# Patient Record
Sex: MALE | Race: WHITE | NOT HISPANIC OR LATINO | Employment: OTHER | URBAN - METROPOLITAN AREA
[De-identification: names, ages, dates, MRNs, and addresses within clinical notes are randomized per-mention and may not be internally consistent; named-entity substitution may affect disease eponyms.]

---

## 2023-10-06 PROBLEM — N52.9 MALE ERECTILE DISORDER: Status: ACTIVE | Noted: 2023-10-06

## 2023-10-06 PROBLEM — R82.90 ABNORMAL URINALYSIS: Status: ACTIVE | Noted: 2023-10-06

## 2023-10-06 PROBLEM — T50.Z95A IMMUNIZATION REACTION: Status: ACTIVE | Noted: 2023-10-06

## 2023-10-06 PROBLEM — E55.9 VITAMIN D DEFICIENCY: Status: ACTIVE | Noted: 2023-10-06

## 2023-10-06 PROBLEM — I10 BENIGN ESSENTIAL HYPERTENSION: Status: ACTIVE | Noted: 2023-10-06

## 2023-10-06 PROBLEM — T50.905A DRUG REACTION: Status: ACTIVE | Noted: 2023-10-06

## 2023-10-06 PROBLEM — K21.9 GERD (GASTROESOPHAGEAL REFLUX DISEASE): Status: ACTIVE | Noted: 2023-10-06

## 2023-10-06 RX ORDER — OMEPRAZOLE 40 MG/1
1 CAPSULE, DELAYED RELEASE ORAL 2 TIMES DAILY
COMMUNITY
Start: 2019-04-30 | End: 2023-12-06

## 2023-10-06 RX ORDER — TIRZEPATIDE 2.5 MG/.5ML
INJECTION, SOLUTION SUBCUTANEOUS
COMMUNITY
Start: 2023-10-02 | End: 2024-01-29 | Stop reason: WASHOUT

## 2023-10-06 RX ORDER — CITALOPRAM 20 MG/1
1 TABLET, FILM COATED ORAL DAILY
COMMUNITY
Start: 2021-09-14 | End: 2023-10-18

## 2023-10-06 RX ORDER — BROMPHENIRAMINE MALEATE, PSEUDOEPHEDRINE HYDROCHLORIDE, AND DEXTROMETHORPHAN HYDROBROMIDE 2; 30; 10 MG/5ML; MG/5ML; MG/5ML
2.5 SYRUP ORAL EVERY 6 HOURS PRN
COMMUNITY
Start: 2022-12-22

## 2023-10-06 RX ORDER — CHLORTHALIDONE 25 MG/1
0.5 TABLET ORAL DAILY
COMMUNITY
Start: 2021-12-21 | End: 2023-12-04 | Stop reason: SDUPTHER

## 2023-10-06 RX ORDER — LISINOPRIL 10 MG/1
1 TABLET ORAL DAILY
COMMUNITY
Start: 2014-06-23 | End: 2024-01-29 | Stop reason: WASHOUT

## 2023-10-06 RX ORDER — MONTELUKAST SODIUM 10 MG/1
TABLET ORAL
COMMUNITY
Start: 2023-08-17

## 2023-10-06 RX ORDER — AZELASTINE HCL 205.5 UG/1
2 SPRAY NASAL 2 TIMES DAILY
COMMUNITY
Start: 2022-11-18 | End: 2024-02-26 | Stop reason: ALTCHOICE

## 2023-10-06 RX ORDER — BENZONATATE 100 MG/1
100 CAPSULE ORAL 3 TIMES DAILY PRN
COMMUNITY
Start: 2022-12-30

## 2023-10-11 ENCOUNTER — OFFICE VISIT (OUTPATIENT)
Dept: DERMATOLOGY | Facility: CLINIC | Age: 68
End: 2023-10-11
Payer: MEDICARE

## 2023-10-11 DIAGNOSIS — L82.0 INFLAMED SEBORRHEIC KERATOSIS: ICD-10-CM

## 2023-10-11 DIAGNOSIS — D23.9 DERMATOFIBROMA: ICD-10-CM

## 2023-10-11 DIAGNOSIS — D22.9 MULTIPLE BENIGN MELANOCYTIC NEVI: Primary | ICD-10-CM

## 2023-10-11 DIAGNOSIS — L81.4 LENTIGO: ICD-10-CM

## 2023-10-11 DIAGNOSIS — L82.1 SEBORRHEIC KERATOSES: ICD-10-CM

## 2023-10-11 DIAGNOSIS — Z12.83 ENCOUNTER FOR SCREENING FOR MALIGNANT NEOPLASM OF SKIN: ICD-10-CM

## 2023-10-11 DIAGNOSIS — L57.8 SUN-DAMAGED SKIN: ICD-10-CM

## 2023-10-11 DIAGNOSIS — D18.01 CHERRY ANGIOMA: ICD-10-CM

## 2023-10-11 PROCEDURE — 99203 OFFICE O/P NEW LOW 30 MIN: CPT | Performed by: DERMATOLOGY

## 2023-10-11 PROCEDURE — 1159F MED LIST DOCD IN RCRD: CPT | Performed by: DERMATOLOGY

## 2023-10-11 NOTE — PROGRESS NOTES
Subjective     Honorio Kenny is a 68 y.o. male who presents for the following: Skin Check (Spot on lip PCP is concerned about. Pt requesting FBSE. ). On his right upper lip is a pale bump he noted about 6 months ago. No change in size, no pain, itching or bleeding he notes at this spot. He also has a new bump on his left thigh. Denies symptoms are change in growth at this site as well. Lastly he has an extremely itchy bump on his back that he scratches regularly, he notes sometimes it bleeds.    Review of Systems:  No other skin or systemic complaints other than what is documented elsewhere in the note.    The following portions of the chart were reviewed this encounter and updated as appropriate:         Skin Cancer History  No skin cancer on file. Pt denies personal or family hx of skin cancer.      Specialty Problems    None       Objective   Well appearing patient in no apparent distress; mood and affect are within normal limits.    A full examination was performed including scalp, head, eyes, ears, nose, lips, neck, chest, axillae, abdomen, back, buttocks, bilateral upper extremities, bilateral lower extremities, hands, feet, fingers, toes, fingernails, and toenails. All findings within normal limits unless otherwise noted below.    Assessment/Plan   1. Multiple benign melanocytic nevi  - scattered regular brown macules and papules, including patients lesion of concern on the right upper lip    Benign melanocytic nevi  - Discussed benign nature and that no treatment is necessary unless it becomes painful or increases in size. Patient opts for clinical monitoring at this time.    - Sun protective behavior reviewed and encouraged including the use of over-the-counter sunscreen with SPF30+ daily (reapply every 1.5 hours when outdoors), UPF clothing, broad rimmed hats, sunglasses, and avoidance of midday sun. Home skin monitoring encouraged and how to monitor for skin cancer (changing or new moles, new rapidly growing  or non-healing lesions) reviewed. Patient encouraged to call with interval concerns or changes.      2. Encounter for screening for malignant neoplasm of skin    3. Dermatofibroma (2)  Left Lower Leg - Anterior, Right Lower Leg - Anterior  Firm pink-brown papule that dimples with lateral pressure.    4. Lentigo  Scattered tan macules in sun-exposed areas.    5. Seborrheic keratoses  - Scattered waxy tan/grey/brown papules with horn cysts    Seborrheic keratosis (-es)  - Discussed benign nature and that no treatment is necessary unless it becomes painful or increases in size. Patient opts for clinical monitoring at this time.      6. Cherry angioma  - scattered small bright red papules and macules    Cherry angioma(s)  - Discussed benign nature and that no treatment is necessary unless it becomes painful or increases in size. Patient opts for clinical monitoring at this time.      7. Sun-damaged skin  - scattered tan macules, telangiectasias, and general photo-damage    Actinically damaged skin-  - Sun protective behavior reviewed and encouraged including the use of over-the-counter sunscreen with SPF30+ daily (reapply every 1.5 hours when outdoors), UPF clothing, broad rimmed hats, sunglasses, and avoidance of midday sun. Home skin monitoring encouraged and how to monitor for skin cancer (changing or new moles, new rapidly growing or non-healing lesions) reviewed. Patient encouraged to call with interval concerns or changes.      8. Inflamed seborrheic keratosis  Left Upper Back    Patient reports extensive itching and irritation of this lesion on back. Provided reassurance to patient that this lesion is benign and no further treatment is required. However, due to evidence of inflammation on exam today and patient's reports of irritation, offered to treat with cryotherapy today.    Destr of lesion - Left Upper Back    Destruction method: cryotherapy    Informed consent: discussed and consent obtained    Cryotherapy  cycles:  2  Outcome: patient tolerated procedure well with no complications    Post-procedure details: wound care instructions given        Patient to follow up as needed for any skin lesions of concern

## 2023-10-18 DIAGNOSIS — F41.8 DEPRESSION WITH ANXIETY: Primary | ICD-10-CM

## 2023-10-18 RX ORDER — CITALOPRAM 20 MG/1
20 TABLET, FILM COATED ORAL DAILY
Qty: 90 TABLET | Refills: 1 | Status: SHIPPED | OUTPATIENT
Start: 2023-10-18 | End: 2024-02-29

## 2023-12-04 DIAGNOSIS — I10 BENIGN ESSENTIAL HYPERTENSION: Primary | ICD-10-CM

## 2023-12-05 RX ORDER — CHLORTHALIDONE 25 MG/1
12.5 TABLET ORAL DAILY
Qty: 90 TABLET | Refills: 1 | Status: SHIPPED | OUTPATIENT
Start: 2023-12-05 | End: 2024-02-29

## 2023-12-06 DIAGNOSIS — K21.9 GASTROESOPHAGEAL REFLUX DISEASE WITHOUT ESOPHAGITIS: Primary | ICD-10-CM

## 2023-12-06 RX ORDER — OMEPRAZOLE 40 MG/1
40 CAPSULE, DELAYED RELEASE ORAL 2 TIMES DAILY
Qty: 200 CAPSULE | Refills: 1 | Status: SHIPPED | OUTPATIENT
Start: 2023-12-06 | End: 2024-06-04

## 2024-01-08 ENCOUNTER — OFFICE VISIT (OUTPATIENT)
Dept: PRIMARY CARE | Facility: CLINIC | Age: 69
End: 2024-01-08
Payer: MEDICARE

## 2024-01-08 ENCOUNTER — LAB (OUTPATIENT)
Dept: LAB | Facility: LAB | Age: 69
End: 2024-01-08
Payer: COMMERCIAL

## 2024-01-08 VITALS
SYSTOLIC BLOOD PRESSURE: 130 MMHG | OXYGEN SATURATION: 96 % | WEIGHT: 280.65 LBS | BODY MASS INDEX: 39.29 KG/M2 | DIASTOLIC BLOOD PRESSURE: 78 MMHG | RESPIRATION RATE: 16 BRPM | HEIGHT: 71 IN | HEART RATE: 70 BPM

## 2024-01-08 DIAGNOSIS — E66.9 OBESITY (BMI 30-39.9): ICD-10-CM

## 2024-01-08 DIAGNOSIS — R73.03 PREDIABETES: ICD-10-CM

## 2024-01-08 DIAGNOSIS — I10 BENIGN ESSENTIAL HYPERTENSION: ICD-10-CM

## 2024-01-08 DIAGNOSIS — I10 BENIGN ESSENTIAL HYPERTENSION: Primary | ICD-10-CM

## 2024-01-08 LAB
ALBUMIN SERPL BCP-MCNC: 4.1 G/DL (ref 3.4–5)
ALP SERPL-CCNC: 87 U/L (ref 33–136)
ALT SERPL W P-5'-P-CCNC: 53 U/L (ref 10–52)
ANION GAP SERPL CALC-SCNC: 13 MMOL/L (ref 10–20)
AST SERPL W P-5'-P-CCNC: 42 U/L (ref 9–39)
BILIRUB SERPL-MCNC: 0.8 MG/DL (ref 0–1.2)
BUN SERPL-MCNC: 22 MG/DL (ref 6–23)
CALCIUM SERPL-MCNC: 9.8 MG/DL (ref 8.6–10.6)
CHLORIDE SERPL-SCNC: 98 MMOL/L (ref 98–107)
CHOLEST SERPL-MCNC: 211 MG/DL (ref 0–199)
CHOLESTEROL/HDL RATIO: 3.6
CO2 SERPL-SCNC: 32 MMOL/L (ref 21–32)
CREAT SERPL-MCNC: 1.07 MG/DL (ref 0.5–1.3)
EGFRCR SERPLBLD CKD-EPI 2021: 75 ML/MIN/1.73M*2
ERYTHROCYTE [DISTWIDTH] IN BLOOD BY AUTOMATED COUNT: 13.1 % (ref 11.5–14.5)
EST. AVERAGE GLUCOSE BLD GHB EST-MCNC: 114 MG/DL
GLUCOSE SERPL-MCNC: 72 MG/DL (ref 74–99)
HBA1C MFR BLD: 5.6 %
HCT VFR BLD AUTO: 43.5 % (ref 41–52)
HDLC SERPL-MCNC: 58.8 MG/DL
HGB BLD-MCNC: 15.1 G/DL (ref 13.5–17.5)
LDLC SERPL CALC-MCNC: 133 MG/DL
MCH RBC QN AUTO: 32.2 PG (ref 26–34)
MCHC RBC AUTO-ENTMCNC: 34.7 G/DL (ref 32–36)
MCV RBC AUTO: 93 FL (ref 80–100)
NON HDL CHOLESTEROL: 152 MG/DL (ref 0–149)
NRBC BLD-RTO: 0 /100 WBCS (ref 0–0)
PLATELET # BLD AUTO: 125 X10*3/UL (ref 150–450)
POTASSIUM SERPL-SCNC: 4.3 MMOL/L (ref 3.5–5.3)
PROT SERPL-MCNC: 7.6 G/DL (ref 6.4–8.2)
RBC # BLD AUTO: 4.69 X10*6/UL (ref 4.5–5.9)
SODIUM SERPL-SCNC: 139 MMOL/L (ref 136–145)
TRIGL SERPL-MCNC: 97 MG/DL (ref 0–149)
VLDL: 19 MG/DL (ref 0–40)
WBC # BLD AUTO: 5.8 X10*3/UL (ref 4.4–11.3)

## 2024-01-08 PROCEDURE — 80061 LIPID PANEL: CPT

## 2024-01-08 PROCEDURE — 3075F SYST BP GE 130 - 139MM HG: CPT | Performed by: INTERNAL MEDICINE

## 2024-01-08 PROCEDURE — 36415 COLL VENOUS BLD VENIPUNCTURE: CPT

## 2024-01-08 PROCEDURE — 3078F DIAST BP <80 MM HG: CPT | Performed by: INTERNAL MEDICINE

## 2024-01-08 PROCEDURE — 80053 COMPREHEN METABOLIC PANEL: CPT

## 2024-01-08 PROCEDURE — 83036 HEMOGLOBIN GLYCOSYLATED A1C: CPT

## 2024-01-08 PROCEDURE — 85027 COMPLETE CBC AUTOMATED: CPT

## 2024-01-08 PROCEDURE — 99214 OFFICE O/P EST MOD 30 MIN: CPT | Performed by: INTERNAL MEDICINE

## 2024-01-08 PROCEDURE — 1159F MED LIST DOCD IN RCRD: CPT | Performed by: INTERNAL MEDICINE

## 2024-01-08 PROCEDURE — 1036F TOBACCO NON-USER: CPT | Performed by: INTERNAL MEDICINE

## 2024-01-08 ASSESSMENT — ENCOUNTER SYMPTOMS
OCCASIONAL FEELINGS OF UNSTEADINESS: 0
LOSS OF SENSATION IN FEET: 0
DEPRESSION: 0

## 2024-01-08 NOTE — PROGRESS NOTES
"Follow UP Visit     Mr. Kenny is a pleasant 68-year-old male with hypertension acid reflux depression anxiety obesity here for follow-up.  Since last visit he stopped taking Mounjaro, weight has gone back up.  Diet is average.  He is trying to be physically active.  He really has no complaints or concerns today.  Taking all his medications.    No fevers no chills, no unintentional weight changes.  No night sweats.    No URI symptoms.    No new or unusual headaches.    No cough no wheezing.    No chest pain or shortness of breath.  No orthopnea no PND.  No palpitations.    Appetite intact, no nausea vomiting diarrhea, no reflux-like symptoms.  No abdominal pain.  No dark stools.    No new joint pain.  No fatigue.  No weakness.    No heat or cold intolerance, constipation diarrhea, unintentional weight changes.    No polyuria polydipsia.      Vitals and exam:Blood pressure 130/78, pulse 70, resp. rate 16, height 1.803 m (5' 11\"), weight 127 kg (280 lb 10.3 oz), SpO2 96 %.  Calm coherent and appropriate    Neck is supple and none tender    Breathing comfortably, clear to auscultation bilaterally    Regular rate rhythm, no murmur gallops or rubs.  Good distal pulses.  No edema.  No JVD.    Abdomen soft and nontender, normal bowel sounds.    Extremities warm well perfused with no clubbing or cyanosis    Cognition intact.    Assessment plan:  Mr. Kenny is a pleasant 68-year-old male here for follow-up doing well.    Hypertension: At goal, continue chlorthalidone.    Acid reflux: Continue omeprazole 20.  We can discuss weaning off at next visit.    Depression anxiety: Well-controlled, no daily symptoms, on citalopram.    Obesity: Unfortunately gained the weight of Mounjaro, recommend starting Mediterranean diet, reduce snacking.  Will try Zepbound and see if insurance covers.    Prediabetes: Check A1c.  Health maintenance: Just had colonoscopy in October with adenomatous polyps, due back in 2030.  Immunization up-to-date " including RSV.  Routine labs today.

## 2024-01-29 ENCOUNTER — TELEPHONE (OUTPATIENT)
Dept: PHARMACY | Facility: CLINIC | Age: 69
End: 2024-01-29

## 2024-01-29 ENCOUNTER — OFFICE VISIT (OUTPATIENT)
Dept: PRIMARY CARE | Facility: CLINIC | Age: 69
End: 2024-01-29
Payer: MEDICARE

## 2024-01-29 VITALS
HEIGHT: 71 IN | WEIGHT: 281 LBS | TEMPERATURE: 96 F | SYSTOLIC BLOOD PRESSURE: 122 MMHG | RESPIRATION RATE: 18 BRPM | DIASTOLIC BLOOD PRESSURE: 78 MMHG | OXYGEN SATURATION: 97 % | BODY MASS INDEX: 39.34 KG/M2 | HEART RATE: 73 BPM

## 2024-01-29 DIAGNOSIS — I10 BENIGN ESSENTIAL HYPERTENSION: ICD-10-CM

## 2024-01-29 DIAGNOSIS — H65.22 LEFT CHRONIC SEROUS OTITIS MEDIA: Primary | ICD-10-CM

## 2024-01-29 DIAGNOSIS — F32.A DEPRESSION, UNSPECIFIED DEPRESSION TYPE: ICD-10-CM

## 2024-01-29 PROCEDURE — 99203 OFFICE O/P NEW LOW 30 MIN: CPT | Performed by: FAMILY MEDICINE

## 2024-01-29 PROCEDURE — 1036F TOBACCO NON-USER: CPT | Performed by: FAMILY MEDICINE

## 2024-01-29 PROCEDURE — 3078F DIAST BP <80 MM HG: CPT | Performed by: FAMILY MEDICINE

## 2024-01-29 PROCEDURE — 1126F AMNT PAIN NOTED NONE PRSNT: CPT | Performed by: FAMILY MEDICINE

## 2024-01-29 PROCEDURE — 99213 OFFICE O/P EST LOW 20 MIN: CPT | Performed by: FAMILY MEDICINE

## 2024-01-29 PROCEDURE — 1159F MED LIST DOCD IN RCRD: CPT | Performed by: FAMILY MEDICINE

## 2024-01-29 PROCEDURE — 3074F SYST BP LT 130 MM HG: CPT | Performed by: FAMILY MEDICINE

## 2024-01-29 RX ORDER — CHLORTHALIDONE 25 MG/1
25 TABLET ORAL DAILY
Qty: 30 TABLET | Refills: 5 | Status: SHIPPED | OUTPATIENT
Start: 2024-01-29 | End: 2024-04-11 | Stop reason: SDUPTHER

## 2024-01-29 RX ORDER — PREDNISONE 20 MG/1
TABLET ORAL
Qty: 20 TABLET | Refills: 0 | Status: SHIPPED | OUTPATIENT
Start: 2024-01-29 | End: 2024-02-10

## 2024-01-29 RX ORDER — CITALOPRAM 40 MG/1
40 TABLET, FILM COATED ORAL DAILY
Qty: 30 TABLET | Refills: 3 | Status: SHIPPED | OUTPATIENT
Start: 2024-01-29 | End: 2024-05-24

## 2024-01-29 ASSESSMENT — PATIENT HEALTH QUESTIONNAIRE - PHQ9
SUM OF ALL RESPONSES TO PHQ9 QUESTIONS 1 AND 2: 0
2. FEELING DOWN, DEPRESSED OR HOPELESS: NOT AT ALL
1. LITTLE INTEREST OR PLEASURE IN DOING THINGS: NOT AT ALL

## 2024-01-29 ASSESSMENT — PAIN SCALES - GENERAL: PAINLEVEL: 0-NO PAIN

## 2024-01-29 NOTE — PROGRESS NOTES
"Subjective   Patient ID: Honorio Kenny is a 68 y.o. male who presents for Ear Fullness.    Ear Fullness          Review of Systems    Objective   /78   Pulse 73   Temp 35.6 °C (96 °F)   Resp 18   Ht 1.803 m (5' 11\")   Wt 127 kg (281 lb)   SpO2 97%   BMI 39.19 kg/m²     Physical Exam  Constitutional:       General: He is not in acute distress.     Appearance: Normal appearance.   Cardiovascular:      Rate and Rhythm: Normal rate and regular rhythm.      Heart sounds: No murmur heard.  Pulmonary:      Breath sounds: Normal breath sounds. No wheezing.   Neurological:      Mental Status: He is alert.         Assessment/Plan   Problem List Items Addressed This Visit             ICD-10-CM    Benign essential hypertension I10    Relevant Medications    chlorthalidone (Hygroton) 25 mg tablet    Left chronic serous otitis media - Primary H65.22    Relevant Medications    predniSONE (Deltasone) 20 mg tablet    Depression F32.A    Relevant Medications    citalopram (CeleXA) 40 mg tablet          "

## 2024-01-29 NOTE — PROGRESS NOTES
"Subjective   Patient ID: Honorio Kenny is a 68 y.o. male who presents for Ear Fullness.    HPI pt is new     Review of Systems    Objective   /78   Pulse 73   Temp 35.6 °C (96 °F)   Resp 18   Ht 1.803 m (5' 11\")   Wt 127 kg (281 lb)   SpO2 97%   BMI 39.19 kg/m²     Physical Exam    Assessment/Plan          "

## 2024-01-31 DIAGNOSIS — E66.9 OBESITY (BMI 30-39.9): Primary | ICD-10-CM

## 2024-01-31 RX ORDER — TIRZEPATIDE 2.5 MG/.5ML
2.5 INJECTION, SOLUTION SUBCUTANEOUS
Qty: 2 ML | Refills: 0 | Status: SHIPPED | OUTPATIENT
Start: 2024-01-31 | End: 2024-02-26

## 2024-02-23 DIAGNOSIS — E66.9 OBESITY (BMI 30-39.9): ICD-10-CM

## 2024-02-26 RX ORDER — TIRZEPATIDE 2.5 MG/.5ML
2.5 INJECTION, SOLUTION SUBCUTANEOUS
Qty: 2 ML | Refills: 1 | Status: SHIPPED | OUTPATIENT
Start: 2024-02-26 | End: 2024-04-26

## 2024-02-29 ENCOUNTER — OFFICE VISIT (OUTPATIENT)
Dept: OTOLARYNGOLOGY | Facility: CLINIC | Age: 69
End: 2024-02-29
Payer: MEDICARE

## 2024-02-29 VITALS — BODY MASS INDEX: 38.92 KG/M2 | HEIGHT: 71 IN | WEIGHT: 278 LBS | TEMPERATURE: 96.9 F

## 2024-02-29 DIAGNOSIS — J31.0 CHRONIC RHINITIS: ICD-10-CM

## 2024-02-29 DIAGNOSIS — H93.293 ABNORMAL AUDITORY PERCEPTION OF BOTH EARS: Primary | ICD-10-CM

## 2024-02-29 PROCEDURE — 99203 OFFICE O/P NEW LOW 30 MIN: CPT | Performed by: OTOLARYNGOLOGY

## 2024-02-29 PROCEDURE — 1126F AMNT PAIN NOTED NONE PRSNT: CPT | Performed by: OTOLARYNGOLOGY

## 2024-02-29 PROCEDURE — 1159F MED LIST DOCD IN RCRD: CPT | Performed by: OTOLARYNGOLOGY

## 2024-02-29 PROCEDURE — 1160F RVW MEDS BY RX/DR IN RCRD: CPT | Performed by: OTOLARYNGOLOGY

## 2024-02-29 PROCEDURE — 1036F TOBACCO NON-USER: CPT | Performed by: OTOLARYNGOLOGY

## 2024-02-29 RX ORDER — AZELASTINE 1 MG/ML
SPRAY, METERED NASAL
Qty: 30 ML | Refills: 11 | Status: SHIPPED | OUTPATIENT
Start: 2024-02-29

## 2024-02-29 NOTE — PROGRESS NOTES
"ABRAHAN Kenny \"Ed\" is a 68 y.o. male few months of perception of muffled hearing while at the same time sensitive to loud noises that are not loud others.  He has been treated with prednisone and Flonase and his nasal symptoms are improved but the ear symptoms are unchanged.  He has not had problems like this before.      Past Medical History:   Diagnosis Date    Allergic     Anxiety     Depression     GERD (gastroesophageal reflux disease)     Hypertension             Medications:     Current Outpatient Medications:     benzonatate (Tessalon) 100 mg capsule, Take 1 capsule (100 mg) by mouth 3 times a day as needed for cough., Disp: , Rfl:     chlorthalidone (Hygroton) 25 mg tablet, Take 1 tablet (25 mg) by mouth once daily., Disp: 30 tablet, Rfl: 5    citalopram (CeleXA) 40 mg tablet, Take 1 tablet (40 mg) by mouth once daily., Disp: 30 tablet, Rfl: 3    montelukast (Singulair) 10 mg tablet, , Disp: , Rfl:     omeprazole (PriLOSEC) 40 mg DR capsule, TAKE 1 CAPSULE BY MOUTH TWICE  DAILY, Disp: 200 capsule, Rfl: 1    tirzepatide (Mounjaro) 2.5 mg/0.5 mL pen injector, Inject 2.5 mg under the skin 1 (one) time per week., Disp: 2 mL, Rfl: 1    brompheniramine-pseudoeph-DM 2-30-10 mg/5 mL syrup, Take 2.5 mL by mouth every 6 hours if needed., Disp: , Rfl:     chlorthalidone (Hygroton) 25 mg tablet, Take 0.5 tablets (12.5 mg) by mouth once daily., Disp: 90 tablet, Rfl: 1    citalopram (CeleXA) 20 mg tablet, TAKE 1 TABLET BY MOUTH DAILY, Disp: 90 tablet, Rfl: 1     Allergies:  No Known Allergies     Physical Exam:  Last Recorded Vitals  Temperature 36.1 °C (96.9 °F), height 1.803 m (5' 11\"), weight 126 kg (278 lb).  General:     General appearance: Well-developed, well-nourished in no acute distress.       Voice:  normal       Head/face: Normal appearance; nontender to palpation     Facial nerve function: Normal and symmetric bilaterally.    Oral/oropharynx:     Oral vestibule: Normal labial and gingival mucosa     " Tongue/floor of mouth: Normal without lesion     Oropharynx: Clear.  No lesions present of the hard/soft palate, posterior pharynx    Neck:     Neck: Normal appearance, trachea midline     Salivary glands: Normal to palpation bilaterally     Lymph nodes: No cervical lymphadenopathy to palpation     Thyroid: No thyromegaly.  No palpable nodules     Range of motion: Normal    Neurological:     Cortical functions: Alert and oriented x3, appropriate affect       Larynx/hypopharynx:     Laryngeal findings: Mirror exam inadequate or limited secondary to enlarged base of tongue and/or excessive gagging    Ear:     Ear canal: Normal bilaterally     Tympanic membrane: Intact and mobile bilaterally     Pinna: Normal bilaterally     Hearing:  Gross hearing assessment normal by voice    Nose:     Visualized using: Anterior rhinoscopy     Nasopharynx: Inadequate mirror exam secondary to gag, anatomy.       Nasal dorsum: Nontraumatic midline appearance     Septum: Midline     Inferior turbinates: Normally sized     Mucosa: Bilateral, pink, normal appearing       Assessment/Plan   Physical exam is unremarkable.  Recommend add azelastine to Flonase and recheck in a month with an audiogram, sooner as needed         Dioni Greene MD

## 2024-03-26 ENCOUNTER — TELEPHONE (OUTPATIENT)
Dept: PRIMARY CARE | Facility: CLINIC | Age: 69
End: 2024-03-26
Payer: MEDICARE

## 2024-03-26 DIAGNOSIS — E66.9 OBESITY (BMI 30-39.9): Primary | ICD-10-CM

## 2024-03-26 RX ORDER — TIRZEPATIDE 5 MG/.5ML
5 INJECTION, SOLUTION SUBCUTANEOUS
Qty: 2 ML | Refills: 3 | Status: SHIPPED | OUTPATIENT
Start: 2024-03-26 | End: 2024-05-24

## 2024-03-29 ENCOUNTER — OFFICE VISIT (OUTPATIENT)
Dept: OTOLARYNGOLOGY | Facility: CLINIC | Age: 69
End: 2024-03-29
Payer: MEDICARE

## 2024-03-29 ENCOUNTER — CLINICAL SUPPORT (OUTPATIENT)
Dept: AUDIOLOGY | Facility: CLINIC | Age: 69
End: 2024-03-29
Payer: MEDICARE

## 2024-03-29 VITALS — BODY MASS INDEX: 38.92 KG/M2 | WEIGHT: 278 LBS | HEIGHT: 71 IN

## 2024-03-29 DIAGNOSIS — H90.3 ASYMMETRICAL SENSORINEURAL HEARING LOSS: ICD-10-CM

## 2024-03-29 DIAGNOSIS — H90.3 ASNHL (ASYMMETRICAL SENSORINEURAL HEARING LOSS): Primary | ICD-10-CM

## 2024-03-29 DIAGNOSIS — H93.13 TINNITUS OF BOTH EARS: ICD-10-CM

## 2024-03-29 DIAGNOSIS — H90.3 SENSORINEURAL HEARING LOSS (SNHL) OF BOTH EARS: Primary | ICD-10-CM

## 2024-03-29 PROCEDURE — 1160F RVW MEDS BY RX/DR IN RCRD: CPT | Performed by: OTOLARYNGOLOGY

## 2024-03-29 PROCEDURE — 99213 OFFICE O/P EST LOW 20 MIN: CPT | Performed by: OTOLARYNGOLOGY

## 2024-03-29 PROCEDURE — 1036F TOBACCO NON-USER: CPT | Performed by: OTOLARYNGOLOGY

## 2024-03-29 PROCEDURE — 92557 COMPREHENSIVE HEARING TEST: CPT | Performed by: AUDIOLOGIST

## 2024-03-29 PROCEDURE — 1159F MED LIST DOCD IN RCRD: CPT | Performed by: OTOLARYNGOLOGY

## 2024-03-29 PROCEDURE — 92550 TYMPANOMETRY & REFLEX THRESH: CPT | Performed by: AUDIOLOGIST

## 2024-03-29 NOTE — PROGRESS NOTES
"ABRAHAN Kenny \"Honorio\" is a 68 y.o. male few months of perception of muffled hearing while at the same time sensitive to loud noises that are not loud others.  He has been treated with prednisone and Flonase and his nasal symptoms are improved but the ear symptoms are unchanged.  He has not had problems like this before.  Audiogram today with left greater than right high-frequency sensorineural hearing loss.      Past Medical History:   Diagnosis Date    Allergic     Anxiety     Depression     GERD (gastroesophageal reflux disease)     Hypertension             Medications:     Current Outpatient Medications:     azelastine (Astelin) 137 mcg (0.1 %) nasal spray, Use 2 sprays in each nostril twice daily, Disp: 30 mL, Rfl: 11    benzonatate (Tessalon) 100 mg capsule, Take 1 capsule (100 mg) by mouth 3 times a day as needed for cough., Disp: , Rfl:     brompheniramine-pseudoeph-DM 2-30-10 mg/5 mL syrup, Take 2.5 mL by mouth every 6 hours if needed., Disp: , Rfl:     chlorthalidone (Hygroton) 25 mg tablet, Take 1 tablet (25 mg) by mouth once daily., Disp: 30 tablet, Rfl: 5    citalopram (CeleXA) 40 mg tablet, Take 1 tablet (40 mg) by mouth once daily., Disp: 30 tablet, Rfl: 3    montelukast (Singulair) 10 mg tablet, , Disp: , Rfl:     omeprazole (PriLOSEC) 40 mg DR capsule, TAKE 1 CAPSULE BY MOUTH TWICE  DAILY, Disp: 200 capsule, Rfl: 1    tirzepatide (Mounjaro) 5 mg/0.5 mL pen injector, Inject 5 mg under the skin 1 (one) time per week., Disp: 2 mL, Rfl: 3    tirzepatide (Mounjaro) 2.5 mg/0.5 mL pen injector, Inject 2.5 mg under the skin 1 (one) time per week. (Patient not taking: Reported on 3/29/2024), Disp: 2 mL, Rfl: 1     Allergies:  No Known Allergies     Physical Exam:  Last Recorded Vitals  Height 1.803 m (5' 11\"), weight 126 kg (278 lb).  General:     General appearance: Well-developed, well-nourished in no acute distress.       Voice:  normal       Head/face: Normal appearance; nontender to palpation     " Facial nerve function: Normal and symmetric bilaterally.    Oral/oropharynx:     Oral vestibule: Normal labial and gingival mucosa     Tongue/floor of mouth: Normal without lesion     Oropharynx: Clear.  No lesions present of the hard/soft palate, posterior pharynx    Neck:     Neck: Normal appearance, trachea midline     Salivary glands: Normal to palpation bilaterally     Lymph nodes: No cervical lymphadenopathy to palpation     Thyroid: No thyromegaly.  No palpable nodules     Range of motion: Normal    Neurological:     Cortical functions: Alert and oriented x3, appropriate affect       Larynx/hypopharynx:     Laryngeal findings: Mirror exam inadequate or limited secondary to enlarged base of tongue and/or excessive gagging    Ear:     Ear canal: Normal bilaterally     Tympanic membrane: Intact and mobile bilaterally     Pinna: Normal bilaterally     Hearing:  Gross hearing assessment normal by voice    Nose:     Visualized using: Anterior rhinoscopy     Nasopharynx: Inadequate mirror exam secondary to gag, anatomy.       Nasal dorsum: Nontraumatic midline appearance     Septum: Midline     Inferior turbinates: Normally sized     Mucosa: Bilateral, pink, normal appearing       Assessment/Plan   Physical exam is unremarkable.  He may continue nasal sprays.  I recommended MRI of the brain and IAC for evaluation of the asymmetry.  Hearing aid evaluation to be considered.  Recheck audiogram 1 year, sooner as needed       Dioni Greene MD

## 2024-03-29 NOTE — PROGRESS NOTES
AUDIOLOGY ADULT AUDIOMETRIC EVALUATION    Name:  Dustin Kenny  :  1955  Age:  68 y.o.  Date of Evaluation:  2024    Reason for visit: Mr. Kenny is seen in the clinic today at the request of otolaryngology for an audiologic evaluation.     HISTORY  Patient complains of HEARING LOSS LEFT , CRACLE SOUND IN BOTH EARS AND DENIES DIZZY AND FULLNESS.    EVALUATION  See scanned audiogram: “Media” > “Audiology Report”.      RESULTS  Otoscopic Evaluation:  Right Ear: clear ear canal  Left Ear: clear ear canal    Immittance Measures:  Tympanometry:  Right Ear: Type A, normal tympanic membrane mobility with normal middle ear pressure   Left Ear: Type A, normal tympanic membrane mobility with normal middle ear pressure     Acoustic Reflexes:  Ipsilateral Right Ear:   Ipsilateral Left Ear:   Contralateral Right Ear: did not evaluate  Contralateral Left Ear: did not evaluate    Distortion Product Otoacoustic Emissions (DPOAEs):  Right Ear: PASS: 1-5 K HZ   REFER:6-8 K HZ.  Left Ear:   PASS:  1-2 K HZ   REFER: 3-8 K HZ.    Audiometry:  Test Technique and Reliability: BEHAVIORAL  Standard audiometry via supra-aural headphones. Reliability is good.    Pure tone air and bone conduction audiometry:  Right Ear: WITHIN NORMAL LIMITS TO 2 K HZ WITH A MILD TO MODERATE SNHL 3-8 K HZ.  Left Ear: MOSTLY WITHIN NORMAL LIMITS TO 2 K HZ WITH AN ASYMMETRIC SNHL DROP AT 3- 8 K HZ.    Speech Audiometry (Word Recognition Scores):   Right Ear:  96% GOOD  Left Ear:    92% GOOD    IMPRESSIONS   ASYMMETRIC LEFT SNHL WITH CRACKLE SOUND AU.    The presence of acoustic reflexes within normal intensity limits is consistent with normal middle ear and brainstem function, and suggests that auditory sensitivity is not significantly impaired. An elevated or absent acoustic reflex threshold is consistent with a middle ear disorder, hearing loss in the stimulated ear, and/or interruption of neural innervation of the stapedius muscle. Present  DPOAEs suggest normal/near normal cochlear outer hair cell function and are consistent with no greater than a mild hearing loss at those frequencies. Absent DPOAEs are consistent with abnormal cochlear outer hair cell function and some degree of hearing loss at those frequencies.    RECOMMENDATIONS  - Follow up with otolaryngology today as scheduled. FOR SPECIAL TESTS AS INDICATED FOR ASYMMETRY AND HEARING LOSS AND TINNITUS.  - Audiologic evaluation as needed.  - Annual audiologic evaluation, sooner if an acute change is noted.  - Audiologic evaluation in conjunction with otologic care, if an acute change is noted, and/or annually.  - Follow-up with audiology annually for routine hearing aid maintenance, sooner if questions/problems arise.  - Follow-up with medical care team as planned.    PATIENT EDUCATION  Discussed results, impressions and recommendations with the patient. Questions were addressed and the patient was encouraged to contact our office should concerns arise.    Time for this encounter: 40 MINUTES    Jamee Landry  Licensed Audiologist

## 2024-04-08 ENCOUNTER — APPOINTMENT (OUTPATIENT)
Dept: PRIMARY CARE | Facility: CLINIC | Age: 69
End: 2024-04-08
Payer: MEDICARE

## 2024-04-11 ENCOUNTER — OFFICE VISIT (OUTPATIENT)
Dept: PRIMARY CARE | Facility: CLINIC | Age: 69
End: 2024-04-11
Payer: MEDICARE

## 2024-04-11 VITALS
BODY MASS INDEX: 36.65 KG/M2 | HEIGHT: 70 IN | RESPIRATION RATE: 18 BRPM | WEIGHT: 256 LBS | DIASTOLIC BLOOD PRESSURE: 78 MMHG | HEART RATE: 76 BPM | TEMPERATURE: 98 F | OXYGEN SATURATION: 98 % | SYSTOLIC BLOOD PRESSURE: 120 MMHG

## 2024-04-11 DIAGNOSIS — Z00.00 ROUTINE MEDICAL EXAM: ICD-10-CM

## 2024-04-11 DIAGNOSIS — I10 BENIGN ESSENTIAL HYPERTENSION: ICD-10-CM

## 2024-04-11 LAB
ALBUMIN SERPL-MCNC: 3.9 G/DL (ref 3.5–5)
ALP BLD-CCNC: 142 U/L (ref 35–125)
ALT SERPL-CCNC: 52 U/L (ref 5–40)
ANION GAP SERPL CALC-SCNC: 15 MMOL/L
AST SERPL-CCNC: 41 U/L (ref 5–40)
BILIRUB SERPL-MCNC: 0.8 MG/DL (ref 0.1–1.2)
BUN SERPL-MCNC: 20 MG/DL (ref 8–25)
CALCIUM SERPL-MCNC: 9.6 MG/DL (ref 8.5–10.4)
CHLORIDE SERPL-SCNC: 99 MMOL/L (ref 97–107)
CO2 SERPL-SCNC: 24 MMOL/L (ref 24–31)
CREAT SERPL-MCNC: 1.1 MG/DL (ref 0.4–1.6)
EGFRCR SERPLBLD CKD-EPI 2021: 72 ML/MIN/1.73M*2
GLUCOSE SERPL-MCNC: 102 MG/DL (ref 65–99)
POTASSIUM SERPL-SCNC: 3.7 MMOL/L (ref 3.4–5.1)
PROT SERPL-MCNC: 7 G/DL (ref 5.9–7.9)
SODIUM SERPL-SCNC: 138 MMOL/L (ref 133–145)

## 2024-04-11 PROCEDURE — 99397 PER PM REEVAL EST PAT 65+ YR: CPT | Performed by: FAMILY MEDICINE

## 2024-04-11 PROCEDURE — 99215 OFFICE O/P EST HI 40 MIN: CPT | Performed by: FAMILY MEDICINE

## 2024-04-11 PROCEDURE — 3078F DIAST BP <80 MM HG: CPT | Performed by: FAMILY MEDICINE

## 2024-04-11 PROCEDURE — 36415 COLL VENOUS BLD VENIPUNCTURE: CPT | Performed by: FAMILY MEDICINE

## 2024-04-11 PROCEDURE — 80053 COMPREHEN METABOLIC PANEL: CPT | Performed by: FAMILY MEDICINE

## 2024-04-11 PROCEDURE — 1160F RVW MEDS BY RX/DR IN RCRD: CPT | Performed by: FAMILY MEDICINE

## 2024-04-11 PROCEDURE — 1036F TOBACCO NON-USER: CPT | Performed by: FAMILY MEDICINE

## 2024-04-11 PROCEDURE — 1126F AMNT PAIN NOTED NONE PRSNT: CPT | Performed by: FAMILY MEDICINE

## 2024-04-11 PROCEDURE — G0439 PPPS, SUBSEQ VISIT: HCPCS | Performed by: FAMILY MEDICINE

## 2024-04-11 PROCEDURE — 1159F MED LIST DOCD IN RCRD: CPT | Performed by: FAMILY MEDICINE

## 2024-04-11 PROCEDURE — 3074F SYST BP LT 130 MM HG: CPT | Performed by: FAMILY MEDICINE

## 2024-04-11 RX ORDER — CHLORTHALIDONE 25 MG/1
25 TABLET ORAL DAILY
Qty: 90 TABLET | Refills: 3 | Status: SHIPPED | OUTPATIENT
Start: 2024-04-11 | End: 2025-04-06

## 2024-04-11 ASSESSMENT — PATIENT HEALTH QUESTIONNAIRE - PHQ9
1. LITTLE INTEREST OR PLEASURE IN DOING THINGS: NOT AT ALL
SUM OF ALL RESPONSES TO PHQ9 QUESTIONS 1 AND 2: 0
2. FEELING DOWN, DEPRESSED OR HOPELESS: NOT AT ALL

## 2024-04-11 ASSESSMENT — PAIN SCALES - GENERAL: PAINLEVEL: 0-NO PAIN

## 2024-04-19 ENCOUNTER — HOSPITAL ENCOUNTER (OUTPATIENT)
Dept: RADIOLOGY | Facility: CLINIC | Age: 69
Discharge: HOME | End: 2024-04-19
Payer: MEDICARE

## 2024-04-19 DIAGNOSIS — H90.3 ASNHL (ASYMMETRICAL SENSORINEURAL HEARING LOSS): ICD-10-CM

## 2024-04-19 PROCEDURE — 70553 MRI BRAIN STEM W/O & W/DYE: CPT

## 2024-04-19 PROCEDURE — A9575 INJ GADOTERATE MEGLUMI 0.1ML: HCPCS | Performed by: OTOLARYNGOLOGY

## 2024-04-19 PROCEDURE — 2550000001 HC RX 255 CONTRASTS: Performed by: OTOLARYNGOLOGY

## 2024-04-19 PROCEDURE — 70553 MRI BRAIN STEM W/O & W/DYE: CPT | Performed by: RADIOLOGY

## 2024-04-19 RX ORDER — GADOTERATE MEGLUMINE 376.9 MG/ML
0.2 INJECTION INTRAVENOUS
Status: COMPLETED | OUTPATIENT
Start: 2024-04-19 | End: 2024-04-19

## 2024-04-19 RX ADMIN — GADOTERATE MEGLUMINE 23 ML: 376.9 INJECTION INTRAVENOUS at 10:58

## 2024-05-14 ENCOUNTER — OFFICE VISIT (OUTPATIENT)
Dept: PRIMARY CARE | Facility: CLINIC | Age: 69
End: 2024-05-14
Payer: MEDICARE

## 2024-05-14 VITALS
TEMPERATURE: 97.8 F | HEIGHT: 70 IN | HEART RATE: 78 BPM | SYSTOLIC BLOOD PRESSURE: 116 MMHG | OXYGEN SATURATION: 98 % | RESPIRATION RATE: 18 BRPM | WEIGHT: 249 LBS | BODY MASS INDEX: 35.65 KG/M2 | DIASTOLIC BLOOD PRESSURE: 76 MMHG

## 2024-05-14 DIAGNOSIS — M54.50 LOW BACK PAIN, UNSPECIFIED BACK PAIN LATERALITY, UNSPECIFIED CHRONICITY, UNSPECIFIED WHETHER SCIATICA PRESENT: ICD-10-CM

## 2024-05-14 LAB
POC APPEARANCE, URINE: CLEAR
POC BILIRUBIN, URINE: NEGATIVE
POC BLOOD, URINE: NEGATIVE
POC COLOR, URINE: YELLOW
POC GLUCOSE, URINE: NEGATIVE MG/DL
POC KETONES, URINE: NEGATIVE MG/DL
POC LEUKOCYTES, URINE: NEGATIVE
POC NITRITE,URINE: NEGATIVE
POC PH, URINE: 6 PH
POC PROTEIN, URINE: NEGATIVE MG/DL
POC SPECIFIC GRAVITY, URINE: 1.02
POC UROBILINOGEN, URINE: 1 EU/DL

## 2024-05-14 PROCEDURE — 1036F TOBACCO NON-USER: CPT | Performed by: FAMILY MEDICINE

## 2024-05-14 PROCEDURE — 1159F MED LIST DOCD IN RCRD: CPT | Performed by: FAMILY MEDICINE

## 2024-05-14 PROCEDURE — 81002 URINALYSIS NONAUTO W/O SCOPE: CPT | Performed by: FAMILY MEDICINE

## 2024-05-14 PROCEDURE — 99213 OFFICE O/P EST LOW 20 MIN: CPT | Performed by: FAMILY MEDICINE

## 2024-05-14 PROCEDURE — 3078F DIAST BP <80 MM HG: CPT | Performed by: FAMILY MEDICINE

## 2024-05-14 PROCEDURE — 3074F SYST BP LT 130 MM HG: CPT | Performed by: FAMILY MEDICINE

## 2024-05-14 PROCEDURE — 1160F RVW MEDS BY RX/DR IN RCRD: CPT | Performed by: FAMILY MEDICINE

## 2024-05-14 PROCEDURE — 1125F AMNT PAIN NOTED PAIN PRSNT: CPT | Performed by: FAMILY MEDICINE

## 2024-05-14 RX ORDER — NAPROXEN 500 MG/1
500 TABLET ORAL 2 TIMES DAILY PRN
Qty: 60 TABLET | Refills: 0 | Status: SHIPPED | OUTPATIENT
Start: 2024-05-14 | End: 2024-06-10

## 2024-05-14 RX ORDER — CYCLOBENZAPRINE HCL 10 MG
10 TABLET ORAL NIGHTLY PRN
Qty: 30 TABLET | Refills: 0 | Status: SHIPPED | OUTPATIENT
Start: 2024-05-14 | End: 2024-06-03

## 2024-05-14 ASSESSMENT — PATIENT HEALTH QUESTIONNAIRE - PHQ9
2. FEELING DOWN, DEPRESSED OR HOPELESS: NOT AT ALL
SUM OF ALL RESPONSES TO PHQ9 QUESTIONS 1 AND 2: 0
1. LITTLE INTEREST OR PLEASURE IN DOING THINGS: NOT AT ALL

## 2024-05-14 ASSESSMENT — PAIN SCALES - GENERAL: PAINLEVEL: 4

## 2024-05-14 ASSESSMENT — ENCOUNTER SYMPTOMS: BACK PAIN: 1

## 2024-05-14 NOTE — PROGRESS NOTES
"Subjective   Patient ID: Honorio Kenny is a 68 y.o. male who presents for Back Pain (PT HERE FOR BACK PAIN.. THINKS DUE TO KIDNEY STONES).    Back Pain     RT FLANK PAIN 2 WKS    Review of Systems   Musculoskeletal:  Positive for back pain.       Objective   /76   Pulse 78   Temp 36.6 °C (97.8 °F) (Temporal)   Resp 18   Ht 1.778 m (5' 10\")   Wt 113 kg (249 lb)   SpO2 98%   BMI 35.73 kg/m²     Physical Exam  Constitutional:       General: He is not in acute distress.     Appearance: Normal appearance.   Cardiovascular:      Rate and Rhythm: Normal rate and regular rhythm.      Heart sounds: No murmur heard.  Pulmonary:      Breath sounds: Normal breath sounds. No wheezing.   Neurological:      Mental Status: He is alert.         Assessment/Plan   Problem List Items Addressed This Visit             ICD-10-CM    Low back pain M54.50    Relevant Orders    POCT UA (nonautomated) manually resulted (Completed)          "

## 2024-05-24 DIAGNOSIS — F32.A DEPRESSION, UNSPECIFIED DEPRESSION TYPE: ICD-10-CM

## 2024-05-24 DIAGNOSIS — E66.9 OBESITY (BMI 30-39.9): ICD-10-CM

## 2024-05-24 RX ORDER — CITALOPRAM 40 MG/1
40 TABLET, FILM COATED ORAL DAILY
Qty: 90 TABLET | Refills: 3 | Status: SHIPPED | OUTPATIENT
Start: 2024-05-24

## 2024-05-24 RX ORDER — TIRZEPATIDE 5 MG/.5ML
INJECTION, SOLUTION SUBCUTANEOUS
Qty: 6 ML | Refills: 0 | Status: SHIPPED | OUTPATIENT
Start: 2024-05-26

## 2024-06-02 DIAGNOSIS — M54.50 LOW BACK PAIN, UNSPECIFIED BACK PAIN LATERALITY, UNSPECIFIED CHRONICITY, UNSPECIFIED WHETHER SCIATICA PRESENT: ICD-10-CM

## 2024-06-03 RX ORDER — CYCLOBENZAPRINE HCL 10 MG
10 TABLET ORAL NIGHTLY PRN
Qty: 30 TABLET | Refills: 0 | Status: SHIPPED | OUTPATIENT
Start: 2024-06-03 | End: 2024-08-02

## 2024-06-04 DIAGNOSIS — K21.9 GASTROESOPHAGEAL REFLUX DISEASE WITHOUT ESOPHAGITIS: ICD-10-CM

## 2024-06-04 RX ORDER — OMEPRAZOLE 40 MG/1
40 CAPSULE, DELAYED RELEASE ORAL 2 TIMES DAILY
Qty: 180 CAPSULE | Refills: 1 | Status: SHIPPED | OUTPATIENT
Start: 2024-06-04

## 2024-06-10 DIAGNOSIS — M54.50 LOW BACK PAIN, UNSPECIFIED BACK PAIN LATERALITY, UNSPECIFIED CHRONICITY, UNSPECIFIED WHETHER SCIATICA PRESENT: Primary | ICD-10-CM

## 2024-06-10 RX ORDER — NAPROXEN 500 MG/1
500 TABLET ORAL 2 TIMES DAILY PRN
Qty: 60 TABLET | Refills: 1 | Status: SHIPPED | OUTPATIENT
Start: 2024-06-10 | End: 2024-09-08

## 2024-07-05 ENCOUNTER — HOSPITAL ENCOUNTER (OUTPATIENT)
Dept: RADIOLOGY | Facility: CLINIC | Age: 69
Discharge: HOME | End: 2024-07-05
Payer: MEDICARE

## 2024-07-05 ENCOUNTER — OFFICE VISIT (OUTPATIENT)
Dept: PRIMARY CARE | Facility: CLINIC | Age: 69
End: 2024-07-05
Payer: MEDICARE

## 2024-07-05 VITALS
TEMPERATURE: 98.1 F | HEART RATE: 78 BPM | HEIGHT: 71 IN | WEIGHT: 251 LBS | DIASTOLIC BLOOD PRESSURE: 80 MMHG | BODY MASS INDEX: 35.14 KG/M2 | SYSTOLIC BLOOD PRESSURE: 124 MMHG | RESPIRATION RATE: 18 BRPM | OXYGEN SATURATION: 96 %

## 2024-07-05 DIAGNOSIS — M25.562 CHRONIC PAIN OF LEFT KNEE: ICD-10-CM

## 2024-07-05 DIAGNOSIS — G89.29 CHRONIC PAIN OF LEFT KNEE: ICD-10-CM

## 2024-07-05 DIAGNOSIS — M25.562 CHRONIC PAIN OF LEFT KNEE: Primary | ICD-10-CM

## 2024-07-05 DIAGNOSIS — G89.29 CHRONIC PAIN OF LEFT KNEE: Primary | ICD-10-CM

## 2024-07-05 PROCEDURE — 20610 DRAIN/INJ JOINT/BURSA W/O US: CPT | Mod: LT | Performed by: FAMILY MEDICINE

## 2024-07-05 PROCEDURE — 99213 OFFICE O/P EST LOW 20 MIN: CPT | Performed by: FAMILY MEDICINE

## 2024-07-05 PROCEDURE — 73562 X-RAY EXAM OF KNEE 3: CPT | Mod: LT

## 2024-07-05 PROCEDURE — 2500000004 HC RX 250 GENERAL PHARMACY W/ HCPCS (ALT 636 FOR OP/ED): Performed by: FAMILY MEDICINE

## 2024-07-05 PROCEDURE — 2500000005 HC RX 250 GENERAL PHARMACY W/O HCPCS: Performed by: FAMILY MEDICINE

## 2024-07-05 PROCEDURE — 73562 X-RAY EXAM OF KNEE 3: CPT | Mod: LEFT SIDE | Performed by: RADIOLOGY

## 2024-07-05 RX ORDER — TRIAMCINOLONE ACETONIDE 40 MG/ML
2.5 INJECTION, SUSPENSION INTRA-ARTICULAR; INTRAMUSCULAR
Status: COMPLETED | OUTPATIENT
Start: 2024-07-05 | End: 2024-07-05

## 2024-07-05 RX ORDER — LIDOCAINE HYDROCHLORIDE 10 MG/ML
0.5 INJECTION INFILTRATION; PERINEURAL
Status: COMPLETED | OUTPATIENT
Start: 2024-07-05 | End: 2024-07-05

## 2024-07-05 ASSESSMENT — PAIN SCALES - GENERAL: PAINLEVEL: 8

## 2024-07-05 NOTE — PROGRESS NOTES
"Patient ID: Dustin Kenny \"Ed\" is a 69 y.o. male.    Joint Injection Large/Arthrocentesis: L knee on 7/5/2024 2:58 PM  Indications: pain  Details: 22 G needle, anteromedial approach  Medications: 2.5 mg triamcinolone acetonide 40 mg/mL; 0.5 mL lidocaine 10 mg/mL (1 %)  Outcome: tolerated well, no immediate complications  Procedure, treatment alternatives, risks and benefits explained, specific risks discussed. Consent was given by the patient. Immediately prior to procedure a time out was called to verify the correct patient, procedure, equipment, support staff and site/side marked as required. Patient was prepped and draped in the usual sterile fashion.       Subjective   Patient ID: Honorio Kenny is a 69 y.o. male who presents for Knee Pain (Pt here for left knee pain).    HPI     Review of Systems    Objective   /80   Pulse 78   Temp 36.7 °C (98.1 °F) (Temporal)   Resp 18   Ht 1.803 m (5' 11\")   Wt 114 kg (251 lb)   SpO2 96%   BMI 35.01 kg/m²     Physical Exam  Constitutional:       General: He is not in acute distress.     Appearance: Normal appearance.   Cardiovascular:      Rate and Rhythm: Normal rate and regular rhythm.      Heart sounds: No murmur heard.  Pulmonary:      Breath sounds: Normal breath sounds. No wheezing.   Neurological:      Mental Status: He is alert.         Assessment/Plan   Problem List Items Addressed This Visit    None  Visit Diagnoses         Codes    Chronic pain of left knee    -  Primary M25.562, G89.29               "

## 2024-07-08 DIAGNOSIS — F32.A DEPRESSION, UNSPECIFIED DEPRESSION TYPE: ICD-10-CM

## 2024-07-08 DIAGNOSIS — M54.50 LOW BACK PAIN, UNSPECIFIED BACK PAIN LATERALITY, UNSPECIFIED CHRONICITY, UNSPECIFIED WHETHER SCIATICA PRESENT: ICD-10-CM

## 2024-07-08 RX ORDER — CYCLOBENZAPRINE HCL 10 MG
10 TABLET ORAL NIGHTLY PRN
Qty: 30 TABLET | Refills: 0 | Status: SHIPPED | OUTPATIENT
Start: 2024-07-08 | End: 2024-09-06

## 2024-07-08 RX ORDER — CITALOPRAM 40 MG/1
40 TABLET, FILM COATED ORAL DAILY
Qty: 90 TABLET | Refills: 3 | Status: SHIPPED | OUTPATIENT
Start: 2024-07-08

## 2024-07-11 ENCOUNTER — APPOINTMENT (OUTPATIENT)
Dept: PRIMARY CARE | Facility: CLINIC | Age: 69
End: 2024-07-11
Payer: MEDICARE

## 2024-07-31 DIAGNOSIS — M54.50 LOW BACK PAIN, UNSPECIFIED BACK PAIN LATERALITY, UNSPECIFIED CHRONICITY, UNSPECIFIED WHETHER SCIATICA PRESENT: ICD-10-CM

## 2024-08-04 DIAGNOSIS — M54.50 LOW BACK PAIN, UNSPECIFIED BACK PAIN LATERALITY, UNSPECIFIED CHRONICITY, UNSPECIFIED WHETHER SCIATICA PRESENT: ICD-10-CM

## 2024-08-05 RX ORDER — CYCLOBENZAPRINE HCL 10 MG
10 TABLET ORAL NIGHTLY PRN
Qty: 30 TABLET | Refills: 2 | Status: SHIPPED | OUTPATIENT
Start: 2024-08-05 | End: 2024-10-04

## 2024-08-05 RX ORDER — NAPROXEN 500 MG/1
500 TABLET ORAL 2 TIMES DAILY PRN
Qty: 60 TABLET | Refills: 1 | Status: SHIPPED | OUTPATIENT
Start: 2024-08-05 | End: 2024-11-03

## 2024-08-14 DIAGNOSIS — E66.9 OBESITY (BMI 30-39.9): ICD-10-CM

## 2024-08-15 RX ORDER — TIRZEPATIDE 5 MG/.5ML
INJECTION, SOLUTION SUBCUTANEOUS
Qty: 6 ML | Refills: 0 | Status: SHIPPED | OUTPATIENT
Start: 2024-08-18

## 2024-08-23 DIAGNOSIS — K21.9 GASTROESOPHAGEAL REFLUX DISEASE WITHOUT ESOPHAGITIS: ICD-10-CM

## 2024-08-23 RX ORDER — OMEPRAZOLE 40 MG/1
40 CAPSULE, DELAYED RELEASE ORAL 2 TIMES DAILY
Qty: 180 CAPSULE | Refills: 1 | Status: SHIPPED | OUTPATIENT
Start: 2024-08-23

## 2024-10-08 DIAGNOSIS — E66.9 OBESITY (BMI 30-39.9): ICD-10-CM

## 2024-10-10 RX ORDER — TIRZEPATIDE 5 MG/.5ML
INJECTION, SOLUTION SUBCUTANEOUS
Qty: 2 ML | Refills: 0 | Status: SHIPPED | OUTPATIENT
Start: 2024-10-13

## 2024-10-22 DIAGNOSIS — E66.9 OBESITY (BMI 30-39.9): ICD-10-CM

## 2024-10-22 RX ORDER — TIRZEPATIDE 5 MG/.5ML
5 INJECTION, SOLUTION SUBCUTANEOUS
Qty: 2 ML | Refills: 0 | Status: SHIPPED | OUTPATIENT
Start: 2024-10-22

## 2024-10-30 DIAGNOSIS — M54.50 LOW BACK PAIN, UNSPECIFIED BACK PAIN LATERALITY, UNSPECIFIED CHRONICITY, UNSPECIFIED WHETHER SCIATICA PRESENT: ICD-10-CM

## 2024-10-31 RX ORDER — CYCLOBENZAPRINE HCL 10 MG
10 TABLET ORAL NIGHTLY PRN
Qty: 60 TABLET | Refills: 0 | Status: SHIPPED | OUTPATIENT
Start: 2024-10-31 | End: 2024-12-30

## 2024-11-01 ENCOUNTER — OFFICE VISIT (OUTPATIENT)
Dept: PRIMARY CARE | Facility: CLINIC | Age: 69
End: 2024-11-01
Payer: MEDICARE

## 2024-11-01 VITALS
HEART RATE: 93 BPM | HEIGHT: 71 IN | TEMPERATURE: 96.4 F | DIASTOLIC BLOOD PRESSURE: 78 MMHG | SYSTOLIC BLOOD PRESSURE: 128 MMHG | OXYGEN SATURATION: 94 % | RESPIRATION RATE: 18 BRPM | BODY MASS INDEX: 37.18 KG/M2 | WEIGHT: 265.6 LBS

## 2024-11-01 DIAGNOSIS — M17.12 OSTEOARTHRITIS OF LEFT KNEE, UNSPECIFIED OSTEOARTHRITIS TYPE: Primary | ICD-10-CM

## 2024-11-01 DIAGNOSIS — Z01.818 PREOP EXAMINATION: ICD-10-CM

## 2024-11-01 PROBLEM — T50.Z95A IMMUNIZATION REACTION: Status: RESOLVED | Noted: 2023-10-06 | Resolved: 2024-11-01

## 2024-11-01 PROBLEM — J43.1 PANLOBULAR EMPHYSEMA (MULTI): Status: ACTIVE | Noted: 2024-11-01

## 2024-11-01 LAB
ALBUMIN SERPL BCP-MCNC: 3.8 G/DL (ref 3.4–5)
ALP SERPL-CCNC: 98 U/L (ref 33–136)
ALT SERPL W P-5'-P-CCNC: 35 U/L (ref 10–52)
ANION GAP SERPL CALCULATED.3IONS-SCNC: 12 MMOL/L (ref 10–20)
AST SERPL W P-5'-P-CCNC: 26 U/L (ref 9–39)
BASOPHILS # BLD AUTO: 0.06 X10*3/UL (ref 0–0.1)
BASOPHILS NFR BLD AUTO: 0.9 %
BILIRUB SERPL-MCNC: 0.8 MG/DL (ref 0–1.2)
BUN SERPL-MCNC: 23 MG/DL (ref 6–23)
CALCIUM SERPL-MCNC: 9.6 MG/DL (ref 8.6–10.3)
CHLORIDE SERPL-SCNC: 101 MMOL/L (ref 98–107)
CO2 SERPL-SCNC: 30 MMOL/L (ref 21–32)
CREAT SERPL-MCNC: 1.09 MG/DL (ref 0.5–1.3)
EGFRCR SERPLBLD CKD-EPI 2021: 73 ML/MIN/1.73M*2
EOSINOPHIL # BLD AUTO: 0.35 X10*3/UL (ref 0–0.4)
EOSINOPHIL NFR BLD AUTO: 5.4 %
ERYTHROCYTE [DISTWIDTH] IN BLOOD BY AUTOMATED COUNT: 13.8 % (ref 11.5–14.5)
GLUCOSE SERPL-MCNC: 103 MG/DL (ref 74–99)
HCT VFR BLD AUTO: 46.7 % (ref 41–52)
HGB BLD-MCNC: 15.8 G/DL (ref 13.5–17.5)
IMM GRANULOCYTES # BLD AUTO: 0.03 X10*3/UL (ref 0–0.5)
IMM GRANULOCYTES NFR BLD AUTO: 0.5 % (ref 0–0.9)
LYMPHOCYTES # BLD AUTO: 1.25 X10*3/UL (ref 0.8–3)
LYMPHOCYTES NFR BLD AUTO: 19.2 %
MCH RBC QN AUTO: 32 PG (ref 26–34)
MCHC RBC AUTO-ENTMCNC: 33.8 G/DL (ref 32–36)
MCV RBC AUTO: 95 FL (ref 80–100)
MONOCYTES # BLD AUTO: 0.62 X10*3/UL (ref 0.05–0.8)
MONOCYTES NFR BLD AUTO: 9.5 %
NEUTROPHILS # BLD AUTO: 4.19 X10*3/UL (ref 1.6–5.5)
NEUTROPHILS NFR BLD AUTO: 64.5 %
NRBC BLD-RTO: 0 /100 WBCS (ref 0–0)
PLATELET # BLD AUTO: 130 X10*3/UL (ref 150–450)
POTASSIUM SERPL-SCNC: 3.4 MMOL/L (ref 3.5–5.3)
PROT SERPL-MCNC: 7.3 G/DL (ref 6.4–8.2)
RBC # BLD AUTO: 4.93 X10*6/UL (ref 4.5–5.9)
RBC MORPH BLD: NORMAL
SODIUM SERPL-SCNC: 140 MMOL/L (ref 136–145)
WBC # BLD AUTO: 6.5 X10*3/UL (ref 4.4–11.3)

## 2024-11-01 PROCEDURE — 85025 COMPLETE CBC W/AUTO DIFF WBC: CPT | Performed by: FAMILY MEDICINE

## 2024-11-01 PROCEDURE — 99213 OFFICE O/P EST LOW 20 MIN: CPT | Performed by: FAMILY MEDICINE

## 2024-11-01 PROCEDURE — 80053 COMPREHEN METABOLIC PANEL: CPT | Performed by: FAMILY MEDICINE

## 2024-11-01 ASSESSMENT — PATIENT HEALTH QUESTIONNAIRE - PHQ9
2. FEELING DOWN, DEPRESSED OR HOPELESS: NOT AT ALL
1. LITTLE INTEREST OR PLEASURE IN DOING THINGS: NOT AT ALL
SUM OF ALL RESPONSES TO PHQ9 QUESTIONS 1 AND 2: 0

## 2024-11-01 ASSESSMENT — PAIN SCALES - GENERAL: PAINLEVEL_OUTOF10: 6

## 2024-11-06 DIAGNOSIS — E87.6 HYPOKALEMIA: ICD-10-CM

## 2024-11-18 DIAGNOSIS — E66.9 OBESITY (BMI 30-39.9): Primary | ICD-10-CM

## 2024-11-18 DIAGNOSIS — E66.9 OBESITY (BMI 30-39.9): ICD-10-CM

## 2024-11-18 RX ORDER — TIRZEPATIDE 5 MG/.5ML
5 INJECTION, SOLUTION SUBCUTANEOUS
Qty: 2 ML | Refills: 3 | Status: SHIPPED | OUTPATIENT
Start: 2024-11-18

## 2024-11-18 RX ORDER — TIRZEPATIDE 7.5 MG/.5ML
7.5 INJECTION, SOLUTION SUBCUTANEOUS
Qty: 2 ML | Refills: 3 | Status: SHIPPED | OUTPATIENT
Start: 2024-11-18

## 2024-11-27 DIAGNOSIS — E66.9 OBESITY (BMI 30-39.9): ICD-10-CM

## 2024-11-27 RX ORDER — TIRZEPATIDE 7.5 MG/.5ML
7.5 INJECTION, SOLUTION SUBCUTANEOUS
Qty: 2 ML | Refills: 3 | OUTPATIENT
Start: 2024-12-01

## 2024-12-28 DIAGNOSIS — M54.50 LOW BACK PAIN, UNSPECIFIED BACK PAIN LATERALITY, UNSPECIFIED CHRONICITY, UNSPECIFIED WHETHER SCIATICA PRESENT: ICD-10-CM

## 2024-12-30 RX ORDER — CYCLOBENZAPRINE HCL 10 MG
10 TABLET ORAL NIGHTLY PRN
Qty: 60 TABLET | Refills: 0 | Status: SHIPPED | OUTPATIENT
Start: 2024-12-30 | End: 2025-02-28

## 2025-01-09 ENCOUNTER — LAB (OUTPATIENT)
Dept: LAB | Facility: LAB | Age: 70
End: 2025-01-09
Payer: MEDICARE

## 2025-01-09 DIAGNOSIS — E87.6 HYPOKALEMIA: ICD-10-CM

## 2025-01-09 LAB
ALBUMIN SERPL BCP-MCNC: 4.2 G/DL (ref 3.4–5)
ALP SERPL-CCNC: 114 U/L (ref 33–136)
ALT SERPL W P-5'-P-CCNC: 57 U/L (ref 10–52)
ANION GAP SERPL CALCULATED.3IONS-SCNC: 14 MMOL/L (ref 10–20)
AST SERPL W P-5'-P-CCNC: 41 U/L (ref 9–39)
BILIRUB SERPL-MCNC: 1 MG/DL (ref 0–1.2)
BUN SERPL-MCNC: 23 MG/DL (ref 6–23)
CALCIUM SERPL-MCNC: 10 MG/DL (ref 8.6–10.3)
CHLORIDE SERPL-SCNC: 95 MMOL/L (ref 98–107)
CO2 SERPL-SCNC: 34 MMOL/L (ref 21–32)
CREAT SERPL-MCNC: 1.19 MG/DL (ref 0.5–1.3)
EGFRCR SERPLBLD CKD-EPI 2021: 65 ML/MIN/1.73M*2
GLUCOSE SERPL-MCNC: 101 MG/DL (ref 74–99)
POTASSIUM SERPL-SCNC: 3.8 MMOL/L (ref 3.5–5.3)
PROT SERPL-MCNC: 7.3 G/DL (ref 6.4–8.2)
SODIUM SERPL-SCNC: 139 MMOL/L (ref 136–145)

## 2025-01-09 PROCEDURE — 80053 COMPREHEN METABOLIC PANEL: CPT

## 2025-02-06 DIAGNOSIS — E66.9 OBESITY (BMI 30-39.9): ICD-10-CM

## 2025-02-07 RX ORDER — TIRZEPATIDE 7.5 MG/.5ML
INJECTION, SOLUTION SUBCUTANEOUS
Qty: 6 ML | Refills: 3 | Status: SHIPPED | OUTPATIENT
Start: 2025-02-07

## 2025-02-17 DIAGNOSIS — K21.9 GASTROESOPHAGEAL REFLUX DISEASE WITHOUT ESOPHAGITIS: ICD-10-CM

## 2025-02-17 RX ORDER — OMEPRAZOLE 40 MG/1
40 CAPSULE, DELAYED RELEASE ORAL 2 TIMES DAILY
Qty: 180 CAPSULE | Refills: 1 | Status: SHIPPED | OUTPATIENT
Start: 2025-02-17

## 2025-02-18 ENCOUNTER — HOSPITAL ENCOUNTER (OUTPATIENT)
Dept: RADIOLOGY | Facility: CLINIC | Age: 70
Discharge: HOME | End: 2025-02-18
Payer: MEDICARE

## 2025-02-18 DIAGNOSIS — H47.10 UNSPECIFIED PAPILLEDEMA: ICD-10-CM

## 2025-02-18 DIAGNOSIS — H53.10 UNSPECIFIED SUBJECTIVE VISUAL DISTURBANCES: ICD-10-CM

## 2025-02-18 PROCEDURE — 70450 CT HEAD/BRAIN W/O DYE: CPT

## 2025-02-27 DIAGNOSIS — M54.50 LOW BACK PAIN, UNSPECIFIED BACK PAIN LATERALITY, UNSPECIFIED CHRONICITY, UNSPECIFIED WHETHER SCIATICA PRESENT: ICD-10-CM

## 2025-02-27 RX ORDER — CYCLOBENZAPRINE HCL 10 MG
10 TABLET ORAL NIGHTLY PRN
Qty: 60 TABLET | Refills: 0 | Status: SHIPPED | OUTPATIENT
Start: 2025-02-27 | End: 2025-04-28

## 2025-03-02 DIAGNOSIS — I10 BENIGN ESSENTIAL HYPERTENSION: ICD-10-CM

## 2025-03-03 RX ORDER — CHLORTHALIDONE 25 MG/1
25 TABLET ORAL DAILY
Qty: 100 TABLET | Refills: 3 | Status: SHIPPED | OUTPATIENT
Start: 2025-03-03

## 2025-03-22 DIAGNOSIS — K21.9 GASTROESOPHAGEAL REFLUX DISEASE WITHOUT ESOPHAGITIS: ICD-10-CM

## 2025-03-24 RX ORDER — OMEPRAZOLE 40 MG/1
40 CAPSULE, DELAYED RELEASE ORAL 2 TIMES DAILY
Qty: 200 CAPSULE | Refills: 1 | Status: SHIPPED | OUTPATIENT
Start: 2025-03-24

## 2025-03-28 ENCOUNTER — HOSPITAL ENCOUNTER (OUTPATIENT)
Dept: RADIOLOGY | Facility: CLINIC | Age: 70
Discharge: HOME | End: 2025-03-28
Payer: MEDICARE

## 2025-03-28 DIAGNOSIS — H54.61 UNQUALIFIED VISUAL LOSS, RIGHT EYE, NORMAL VISION LEFT EYE: ICD-10-CM

## 2025-03-28 DIAGNOSIS — H57.02 ANISOCORIA: ICD-10-CM

## 2025-03-28 PROCEDURE — 70551 MRI BRAIN STEM W/O DYE: CPT

## 2025-03-28 PROCEDURE — 2550000001 HC RX 255 CONTRASTS: Performed by: PSYCHIATRY & NEUROLOGY

## 2025-03-28 PROCEDURE — A9575 INJ GADOTERATE MEGLUMI 0.1ML: HCPCS | Performed by: PSYCHIATRY & NEUROLOGY

## 2025-03-28 PROCEDURE — 70544 MR ANGIOGRAPHY HEAD W/O DYE: CPT

## 2025-03-28 PROCEDURE — 70543 MRI ORBT/FAC/NCK W/O &W/DYE: CPT

## 2025-03-28 RX ORDER — GADOTERATE MEGLUMINE 376.9 MG/ML
20 INJECTION INTRAVENOUS
Status: COMPLETED | OUTPATIENT
Start: 2025-03-28 | End: 2025-03-28

## 2025-03-28 RX ADMIN — GADOTERATE MEGLUMINE 20 ML: 376.9 INJECTION INTRAVENOUS at 14:22

## 2025-04-04 ENCOUNTER — OFFICE VISIT (OUTPATIENT)
Dept: PRIMARY CARE | Facility: CLINIC | Age: 70
End: 2025-04-04
Payer: MEDICARE

## 2025-04-04 VITALS
RESPIRATION RATE: 18 BRPM | BODY MASS INDEX: 36.68 KG/M2 | OXYGEN SATURATION: 96 % | TEMPERATURE: 96.8 F | SYSTOLIC BLOOD PRESSURE: 108 MMHG | HEIGHT: 71 IN | HEART RATE: 94 BPM | WEIGHT: 262 LBS | DIASTOLIC BLOOD PRESSURE: 62 MMHG

## 2025-04-04 DIAGNOSIS — Z01.818 PREOP EXAMINATION: Primary | ICD-10-CM

## 2025-04-04 DIAGNOSIS — M17.12 OSTEOARTHRITIS OF LEFT KNEE, UNSPECIFIED OSTEOARTHRITIS TYPE: ICD-10-CM

## 2025-04-04 DIAGNOSIS — F41.9 ANXIETY: ICD-10-CM

## 2025-04-04 PROCEDURE — 93005 ELECTROCARDIOGRAM TRACING: CPT | Performed by: FAMILY MEDICINE

## 2025-04-04 PROCEDURE — 99213 OFFICE O/P EST LOW 20 MIN: CPT | Performed by: FAMILY MEDICINE

## 2025-04-04 PROCEDURE — 1123F ACP DISCUSS/DSCN MKR DOCD: CPT | Performed by: FAMILY MEDICINE

## 2025-04-04 PROCEDURE — 3074F SYST BP LT 130 MM HG: CPT | Performed by: FAMILY MEDICINE

## 2025-04-04 PROCEDURE — 1126F AMNT PAIN NOTED NONE PRSNT: CPT | Performed by: FAMILY MEDICINE

## 2025-04-04 PROCEDURE — 93010 ELECTROCARDIOGRAM REPORT: CPT | Performed by: FAMILY MEDICINE

## 2025-04-04 PROCEDURE — 3078F DIAST BP <80 MM HG: CPT | Performed by: FAMILY MEDICINE

## 2025-04-04 PROCEDURE — 99213 OFFICE O/P EST LOW 20 MIN: CPT | Mod: 25 | Performed by: FAMILY MEDICINE

## 2025-04-04 PROCEDURE — 3008F BODY MASS INDEX DOCD: CPT | Performed by: FAMILY MEDICINE

## 2025-04-04 PROCEDURE — 1159F MED LIST DOCD IN RCRD: CPT | Performed by: FAMILY MEDICINE

## 2025-04-04 PROCEDURE — 1036F TOBACCO NON-USER: CPT | Performed by: FAMILY MEDICINE

## 2025-04-04 RX ORDER — HYDROXYZINE HYDROCHLORIDE 25 MG/1
25 TABLET, FILM COATED ORAL 2 TIMES DAILY
Qty: 60 TABLET | Refills: 0 | Status: SHIPPED | OUTPATIENT
Start: 2025-04-04 | End: 2025-05-04

## 2025-04-04 ASSESSMENT — PATIENT HEALTH QUESTIONNAIRE - PHQ9
SUM OF ALL RESPONSES TO PHQ9 QUESTIONS 1 AND 2: 0
1. LITTLE INTEREST OR PLEASURE IN DOING THINGS: NOT AT ALL
2. FEELING DOWN, DEPRESSED OR HOPELESS: NOT AT ALL

## 2025-04-04 ASSESSMENT — PAIN SCALES - GENERAL: PAINLEVEL_OUTOF10: 0-NO PAIN

## 2025-04-04 NOTE — PROGRESS NOTES
"Subjective   Patient ID: Honorio Kenny is a 69 y.o. male who presents for Pre-op Exam.    HPI pt here for presurgical clearance he is having LT TKR  4/17/25 by Dr. Vallejo       Review of Systems    Objective   /62   Pulse 94   Temp 36 °C (96.8 °F)   Resp 18   Ht 1.803 m (5' 11\")   Wt 119 kg (262 lb)   SpO2 96%   BMI 36.54 kg/m²     Physical Exam  Constitutional:       General: He is not in acute distress.     Appearance: Normal appearance.   Cardiovascular:      Rate and Rhythm: Normal rate and regular rhythm.      Heart sounds: No murmur heard.  Pulmonary:      Breath sounds: Normal breath sounds. No wheezing.   Neurological:      Mental Status: He is alert.       Assessment/Plan   Problem List Items Addressed This Visit    None  Visit Diagnoses         Codes    Preop examination    -  Primary Z01.818    Relevant Orders    CBC and Auto Differential    Basic Metabolic Panel    Osteoarthritis of left knee, unspecified osteoarthritis type     M17.12    Anxiety     F41.9        Cleared for  surgery from a primary care stand point       "

## 2025-04-07 LAB
GLUCOSE SERPL-MCNC: NORMAL MG/DL
WBC # BLD AUTO: NORMAL THOUSAND/UL

## 2025-04-09 DIAGNOSIS — Z01.818 PREOP EXAMINATION: ICD-10-CM

## 2025-04-11 LAB
ALBUMIN SERPL-MCNC: 4.1 G/DL (ref 3.6–5.1)
ALP SERPL-CCNC: 111 U/L (ref 35–144)
ALT SERPL-CCNC: 33 U/L (ref 9–46)
ANION GAP SERPL CALCULATED.4IONS-SCNC: 10 MMOL/L (CALC) (ref 7–17)
AST SERPL-CCNC: 26 U/L (ref 10–35)
BASOPHILS # BLD AUTO: 68 CELLS/UL (ref 0–200)
BASOPHILS NFR BLD AUTO: 1 %
BILIRUB SERPL-MCNC: 0.9 MG/DL (ref 0.2–1.2)
BUN SERPL-MCNC: 29 MG/DL (ref 7–25)
CALCIUM SERPL-MCNC: 9.8 MG/DL (ref 8.6–10.3)
CHLORIDE SERPL-SCNC: 97 MMOL/L (ref 98–110)
CO2 SERPL-SCNC: 33 MMOL/L (ref 20–32)
CREAT SERPL-MCNC: 1.18 MG/DL (ref 0.7–1.28)
EGFRCR SERPLBLD CKD-EPI 2021: 66 ML/MIN/1.73M2
EOSINOPHIL # BLD AUTO: 354 CELLS/UL (ref 15–500)
EOSINOPHIL NFR BLD AUTO: 5.2 %
ERYTHROCYTE [DISTWIDTH] IN BLOOD BY AUTOMATED COUNT: 13.1 % (ref 11–15)
GLUCOSE SERPL-MCNC: 104 MG/DL (ref 65–99)
HCT VFR BLD AUTO: 45.8 % (ref 38.5–50)
HGB BLD-MCNC: 15.1 G/DL (ref 13.2–17.1)
LYMPHOCYTES # BLD AUTO: 1401 CELLS/UL (ref 850–3900)
LYMPHOCYTES NFR BLD AUTO: 20.6 %
MCH RBC QN AUTO: 31.5 PG (ref 27–33)
MCHC RBC AUTO-ENTMCNC: 33 G/DL (ref 32–36)
MCV RBC AUTO: 95.6 FL (ref 80–100)
MONOCYTES # BLD AUTO: 632 CELLS/UL (ref 200–950)
MONOCYTES NFR BLD AUTO: 9.3 %
NEUTROPHILS # BLD AUTO: 4345 CELLS/UL (ref 1500–7800)
NEUTROPHILS NFR BLD AUTO: 63.9 %
PLATELET # BLD AUTO: 131 THOUSAND/UL (ref 140–400)
PMV BLD REES-ECKER: 12.1 FL (ref 7.5–12.5)
POTASSIUM SERPL-SCNC: 3.9 MMOL/L (ref 3.5–5.3)
PROT SERPL-MCNC: 7.1 G/DL (ref 6.1–8.1)
RBC # BLD AUTO: 4.79 MILLION/UL (ref 4.2–5.8)
SODIUM SERPL-SCNC: 140 MMOL/L (ref 135–146)
WBC # BLD AUTO: 6.8 THOUSAND/UL (ref 3.8–10.8)

## 2025-04-16 ENCOUNTER — DOCUMENTATION (OUTPATIENT)
Dept: HOME HEALTH SERVICES | Facility: HOME HEALTH | Age: 70
End: 2025-04-16
Payer: MEDICARE

## 2025-04-16 ENCOUNTER — HOME HEALTH ADMISSION (OUTPATIENT)
Dept: HOME HEALTH SERVICES | Facility: HOME HEALTH | Age: 70
End: 2025-04-16
Payer: MEDICARE

## 2025-04-16 NOTE — HH CARE COORDINATION
Home Care received a Referral for Physical Therapy. We have processed the referral for a Start of Care on 04/18.     If you have any questions or concerns, please feel free to contact us at [phone]. Follow the prompts, enter your five digit zip code, and you will be directed to your care team on [facility].

## 2025-04-18 ENCOUNTER — HOME CARE VISIT (OUTPATIENT)
Dept: HOME HEALTH SERVICES | Facility: HOME HEALTH | Age: 70
End: 2025-04-18
Payer: MEDICARE

## 2025-04-18 VITALS — DIASTOLIC BLOOD PRESSURE: 82 MMHG | HEART RATE: 76 BPM | SYSTOLIC BLOOD PRESSURE: 122 MMHG | TEMPERATURE: 98 F

## 2025-04-18 PROCEDURE — G0151 HHCP-SERV OF PT,EA 15 MIN: HCPCS

## 2025-04-18 SDOH — HEALTH STABILITY: PHYSICAL HEALTH: EXERCISE COMMENTS: SUPINE AP QS GS HEEL SLIDES SAQ  SITTING LAQ HEEL SLIDES AP X 10 REPS

## 2025-04-18 ASSESSMENT — ACTIVITIES OF DAILY LIVING (ADL)
OASIS_M1830: 05
ENTERING_EXITING_HOME: STAND BY ASSIST

## 2025-04-18 ASSESSMENT — ENCOUNTER SYMPTOMS
PAIN LOCATION - PAIN QUALITY: ACHEY
DEPRESSION: 0
PAIN LOCATION - PAIN SEVERITY: 7/10
LOSS OF SENSATION IN FEET: 0
HIGHEST PAIN SEVERITY IN PAST 24 HOURS: 8/10
OCCASIONAL FEELINGS OF UNSTEADINESS: 1
PAIN: 1
PERSON REPORTING PAIN: PATIENT
LOWEST PAIN SEVERITY IN PAST 24 HOURS: 0/10
PAIN LOCATION: LEFT KNEE

## 2025-04-23 ENCOUNTER — HOME CARE VISIT (OUTPATIENT)
Dept: HOME HEALTH SERVICES | Facility: HOME HEALTH | Age: 70
End: 2025-04-23
Payer: MEDICARE

## 2025-04-23 VITALS
OXYGEN SATURATION: 98 % | DIASTOLIC BLOOD PRESSURE: 62 MMHG | RESPIRATION RATE: 18 BRPM | TEMPERATURE: 98 F | HEART RATE: 78 BPM | SYSTOLIC BLOOD PRESSURE: 115 MMHG

## 2025-04-23 PROCEDURE — G0151 HHCP-SERV OF PT,EA 15 MIN: HCPCS

## 2025-04-23 SDOH — HEALTH STABILITY: PHYSICAL HEALTH: EXERCISE ACTIVITIES SETS: 1

## 2025-04-23 SDOH — HEALTH STABILITY: PHYSICAL HEALTH

## 2025-04-23 SDOH — HEALTH STABILITY: PHYSICAL HEALTH: PHYSICAL EXERCISE: SITTING

## 2025-04-23 SDOH — HEALTH STABILITY: PHYSICAL HEALTH: PHYSICAL EXERCISE: SUPINE

## 2025-04-23 SDOH — HEALTH STABILITY: PHYSICAL HEALTH: PHYSICAL EXERCISE: 10

## 2025-04-23 SDOH — HEALTH STABILITY: PHYSICAL HEALTH: EXERCISE ACTIVITY: APS, GLUTE/QUAD SETS, HEEL SLIDE, SLR, SAQ, HIP ABD

## 2025-04-23 SDOH — HEALTH STABILITY: PHYSICAL HEALTH: EXERCISE ACTIVITY: APS, MARCHING, LAQ, HEEL SLIDE, HIP ABD/ADD

## 2025-04-23 ASSESSMENT — ENCOUNTER SYMPTOMS
PAIN LOCATION - EXACERBATING FACTORS: MOVEMENT
PAIN LOCATION: LEFT KNEE
SUBJECTIVE PAIN PROGRESSION: GRADUALLY IMPROVING
PAIN LOCATION - RELIEVING FACTORS: REST, ICE, MEDS
HIGHEST PAIN SEVERITY IN PAST 24 HOURS: 10/10
LOWEST PAIN SEVERITY IN PAST 24 HOURS: 6/10
PAIN: 1
PAIN LOCATION - PAIN SEVERITY: 6/10
PERSON REPORTING PAIN: PATIENT
PAIN LOCATION - PAIN QUALITY: DULL

## 2025-04-24 ENCOUNTER — TELEPHONE (OUTPATIENT)
Dept: PRIMARY CARE | Facility: CLINIC | Age: 70
End: 2025-04-24
Payer: MEDICARE

## 2025-04-24 DIAGNOSIS — E66.9 OBESITY (BMI 30-39.9): ICD-10-CM

## 2025-04-25 ENCOUNTER — HOME CARE VISIT (OUTPATIENT)
Dept: HOME HEALTH SERVICES | Facility: HOME HEALTH | Age: 70
End: 2025-04-25
Payer: MEDICARE

## 2025-04-25 VITALS
TEMPERATURE: 97.6 F | RESPIRATION RATE: 18 BRPM | OXYGEN SATURATION: 98 % | DIASTOLIC BLOOD PRESSURE: 72 MMHG | SYSTOLIC BLOOD PRESSURE: 124 MMHG | HEART RATE: 81 BPM

## 2025-04-25 PROCEDURE — G0151 HHCP-SERV OF PT,EA 15 MIN: HCPCS

## 2025-04-25 SDOH — HEALTH STABILITY: PHYSICAL HEALTH: EXERCISE ACTIVITIES SETS: 1

## 2025-04-25 SDOH — HEALTH STABILITY: PHYSICAL HEALTH

## 2025-04-25 SDOH — HEALTH STABILITY: PHYSICAL HEALTH: EXERCISE ACTIVITY: CALF RAISES, HIP FLEX/EXT/ABD

## 2025-04-25 SDOH — HEALTH STABILITY: PHYSICAL HEALTH: PHYSICAL EXERCISE: SUPINE

## 2025-04-25 SDOH — HEALTH STABILITY: PHYSICAL HEALTH: PHYSICAL EXERCISE: STANDING

## 2025-04-25 SDOH — HEALTH STABILITY: PHYSICAL HEALTH: PHYSICAL EXERCISE: SITTING

## 2025-04-25 SDOH — HEALTH STABILITY: PHYSICAL HEALTH: PHYSICAL EXERCISE: 10

## 2025-04-25 SDOH — HEALTH STABILITY: PHYSICAL HEALTH: EXERCISE ACTIVITY: APS, GLUTE/QUAD SETS, HEEL SLIDE, SLR, SAQ, HIP ABD

## 2025-04-25 SDOH — HEALTH STABILITY: PHYSICAL HEALTH: EXERCISE ACTIVITY: APS, MARCHING, LAQ, HEEL SLIDE

## 2025-04-25 ASSESSMENT — ENCOUNTER SYMPTOMS
PAIN LOCATION - PAIN QUALITY: ACHING
PAIN LOCATION: LEFT KNEE
LOWEST PAIN SEVERITY IN PAST 24 HOURS: 4/10
PAIN LOCATION - RELIEVING FACTORS: REST, ICE, MEDS
PAIN: 1
PERSON REPORTING PAIN: PATIENT
HIGHEST PAIN SEVERITY IN PAST 24 HOURS: 6/10
PAIN LOCATION - EXACERBATING FACTORS: MOVEMENT
PAIN LOCATION - PAIN SEVERITY: 5/10
SUBJECTIVE PAIN PROGRESSION: GRADUALLY IMPROVING

## 2025-04-27 DIAGNOSIS — M54.50 LOW BACK PAIN, UNSPECIFIED BACK PAIN LATERALITY, UNSPECIFIED CHRONICITY, UNSPECIFIED WHETHER SCIATICA PRESENT: ICD-10-CM

## 2025-04-28 RX ORDER — CYCLOBENZAPRINE HCL 10 MG
10 TABLET ORAL NIGHTLY PRN
Qty: 60 TABLET | Refills: 0 | Status: SHIPPED | OUTPATIENT
Start: 2025-04-28 | End: 2025-06-27

## 2025-04-30 ENCOUNTER — HOME CARE VISIT (OUTPATIENT)
Dept: HOME HEALTH SERVICES | Facility: HOME HEALTH | Age: 70
End: 2025-04-30
Payer: MEDICARE

## 2025-04-30 VITALS
TEMPERATURE: 98.3 F | DIASTOLIC BLOOD PRESSURE: 72 MMHG | RESPIRATION RATE: 18 BRPM | SYSTOLIC BLOOD PRESSURE: 132 MMHG | HEART RATE: 83 BPM | OXYGEN SATURATION: 98 %

## 2025-04-30 PROCEDURE — G0151 HHCP-SERV OF PT,EA 15 MIN: HCPCS

## 2025-04-30 SDOH — HEALTH STABILITY: PHYSICAL HEALTH: EXERCISE ACTIVITY: APS, MARCHING, LAQ, HEEL SLIDE

## 2025-04-30 SDOH — HEALTH STABILITY: PHYSICAL HEALTH

## 2025-04-30 SDOH — HEALTH STABILITY: PHYSICAL HEALTH: PHYSICAL EXERCISE: SUPINE

## 2025-04-30 SDOH — HEALTH STABILITY: PHYSICAL HEALTH: EXERCISE ACTIVITIES SETS: 1

## 2025-04-30 SDOH — HEALTH STABILITY: PHYSICAL HEALTH: PHYSICAL EXERCISE: 10

## 2025-04-30 SDOH — HEALTH STABILITY: PHYSICAL HEALTH: PHYSICAL EXERCISE: STANDING

## 2025-04-30 SDOH — HEALTH STABILITY: PHYSICAL HEALTH: EXERCISE ACTIVITY: CALF RAISES, HIP FLEX/EXT/ABD, HS CURL, MARCHING, MINI SQUATS

## 2025-04-30 SDOH — HEALTH STABILITY: PHYSICAL HEALTH: PHYSICAL EXERCISE: SITTING

## 2025-04-30 SDOH — HEALTH STABILITY: PHYSICAL HEALTH: EXERCISE ACTIVITY: APS, GLUTE/QUAD SETS, HEEL SLIDE, SLR, SAQ, HIP ABD

## 2025-04-30 ASSESSMENT — ENCOUNTER SYMPTOMS
PAIN: 1
LOWEST PAIN SEVERITY IN PAST 24 HOURS: 3/10
PERSON REPORTING PAIN: PATIENT
PAIN LOCATION - PAIN QUALITY: ACHING
PAIN LOCATION - PAIN SEVERITY: 3/10
SUBJECTIVE PAIN PROGRESSION: GRADUALLY IMPROVING
PAIN LOCATION - RELIEVING FACTORS: REST, ICE, MEDS
PAIN LOCATION: LEFT KNEE
HIGHEST PAIN SEVERITY IN PAST 24 HOURS: 6/10
PAIN LOCATION - EXACERBATING FACTORS: MOVEMENT

## 2025-05-02 ENCOUNTER — HOME CARE VISIT (OUTPATIENT)
Dept: HOME HEALTH SERVICES | Facility: HOME HEALTH | Age: 70
End: 2025-05-02
Payer: MEDICARE

## 2025-05-02 VITALS
SYSTOLIC BLOOD PRESSURE: 126 MMHG | TEMPERATURE: 98 F | DIASTOLIC BLOOD PRESSURE: 72 MMHG | OXYGEN SATURATION: 98 % | HEART RATE: 78 BPM | RESPIRATION RATE: 18 BRPM

## 2025-05-02 PROCEDURE — G0151 HHCP-SERV OF PT,EA 15 MIN: HCPCS

## 2025-05-02 ASSESSMENT — ACTIVITIES OF DAILY LIVING (ADL)
AMBULATION ASSISTANCE: INDEPENDENT
OASIS_M1830: 00
CURRENT_FUNCTION: INDEPENDENT
AMBULATION ASSISTANCE ON FLAT SURFACES: 1
AMBULATION_DISTANCE/DURATION_TOLERATED: 150 FT
AMBULATION ASSISTANCE: 1
PHYSICAL TRANSFERS ASSESSED: 1
HOME_HEALTH_OASIS: 00

## 2025-05-02 ASSESSMENT — ENCOUNTER SYMPTOMS
PAIN LOCATION - PAIN SEVERITY: 3/10
PAIN LOCATION - PAIN QUALITY: ACHING
HIGHEST PAIN SEVERITY IN PAST 24 HOURS: 3/10
PAIN: 1
PERSON REPORTING PAIN: PATIENT
LIMITED RANGE OF MOTION: 1
LOWEST PAIN SEVERITY IN PAST 24 HOURS: 2/10
PAIN LOCATION - EXACERBATING FACTORS: POST EXERCISE
SUBJECTIVE PAIN PROGRESSION: GRADUALLY IMPROVING
PAIN LOCATION - RELIEVING FACTORS: REST, ICE, MEDS
PAIN LOCATION: LEFT KNEE

## 2025-05-06 ENCOUNTER — EVALUATION (OUTPATIENT)
Dept: PHYSICAL THERAPY | Facility: CLINIC | Age: 70
End: 2025-05-06
Payer: MEDICARE

## 2025-05-06 DIAGNOSIS — Z96.652 PRESENCE OF LEFT ARTIFICIAL KNEE JOINT: ICD-10-CM

## 2025-05-06 DIAGNOSIS — M17.12 UNILATERAL PRIMARY OSTEOARTHRITIS, LEFT KNEE: ICD-10-CM

## 2025-05-06 DIAGNOSIS — M17.12 LOCALIZED OSTEOARTHRITIS OF LEFT KNEE: Primary | ICD-10-CM

## 2025-05-06 DIAGNOSIS — Z96.652 HISTORY OF TOTAL LEFT KNEE REPLACEMENT: ICD-10-CM

## 2025-05-06 DIAGNOSIS — R26.2 DIFFICULTY WALKING: ICD-10-CM

## 2025-05-06 PROCEDURE — 97161 PT EVAL LOW COMPLEX 20 MIN: CPT | Mod: GP | Performed by: PHYSICAL THERAPIST

## 2025-05-06 PROCEDURE — 97110 THERAPEUTIC EXERCISES: CPT | Mod: GP | Performed by: PHYSICAL THERAPIST

## 2025-05-06 ASSESSMENT — PAIN - FUNCTIONAL ASSESSMENT: PAIN_FUNCTIONAL_ASSESSMENT: 0-10

## 2025-05-06 ASSESSMENT — PAIN SCALES - GENERAL: PAINLEVEL_OUTOF10: 2

## 2025-05-06 NOTE — PROGRESS NOTES
"Physical Therapy  Physical Therapy Orthopedic Evaluation    Patient Name: Dustin Kenny \"Ed\"  MRN: [id number]  Today's Date: 5/6/2025    Insurance:  Payor: [employer] MEDICARE / Plan: [employer] MEDICARE / Product Type: *No Product type* /   Number of Treatments Authorized: [email or web address]  Certification Period Start Date: 05/06/25  Certification Period End Date: 08/04/25    Current Problem  Problem List Items Addressed This Visit           ICD-10-CM    Localized osteoarthritis of left knee - Primary M17.12    Relevant Orders    Follow Up In Physical Therapy    Difficulty walking R26.2    Relevant Orders    Follow Up In Physical Therapy    Presence of left artificial knee joint Z96.652    Relevant Orders    Follow Up In Physical Therapy     Other Visit Diagnoses         Codes      Unilateral primary osteoarthritis, left knee     M17.12    Relevant Orders    Follow Up In Physical Therapy      History of total left knee replacement     Z96.652    Relevant Orders    Follow Up In Physical Therapy            Precautions:   Precautions  STEADI Fall Risk Score (The score of 4 or more indicates an increased risk of falling): No falls or concerns of falls.  Precautions Comment: Standard TKA precautions.    Medical History Form: Reviewed (scanned into chart)    Subjective:   Subjective   General:  General  Reason for Referral: L TKA  Referred By: MD Yusef  Past Medical History Relevant to Rehab: HTN, OA, Hearing aides  General Comment: Patient underwent L TKA on 4/17/2025 without complication.  Has been discharged for [facility] - performing HEP as instructed - 2 x a day.    Pain:  Pain Assessment: 0-10  0-10 (Numeric) Pain Score: 2 (worst: 4/10 w/medication)  Pain Type: Surgical pain  Pain Location: Knee  Pain Orientation: Left  Pain Frequency: Constant/continuous    Relevant Information (PMH & Previous Tests/Imaging): No new X-rays since surgery  Previous Interventions/Treatments: Physical Therapy - HHC    Prior Level " of Function (PLOF)  Prior Function Per Pt/Caregiver Report  Level of [place]: Independent with ADLs and functional transfers, Independent with homemaking with ambulation  Patient previously independent with all ADLs    Patients Living Environment:   Home Living Comment: Lives with spouse in multi-level home -  1 step to enter.    Primary Language: English    Red Flags: Do you have any of the following? No  Fever/chills, unexplained weight changes, dizziness/fainting, unexplained change in bowel or bladder functions, unexplained malaise or muscle weakness, night pain/sweats, numbness or tingling    Objective   Circumferential at L mid patella:  52 cm  Steri Strips in place - incision with good healing.   HIP  Hip AROM  R hip flexion: (125°): 115  L hip flexion: (125°): 115  R hip ER: (45°): 45  L hip ER: (45°): 40  R hip IR: (45°): 25  L hip IR: (45°): 15  Specific Lower Extremity MMT   R Iliopsoas: (5/5): 5/5  L Iliopsoas: (5/5): 4/5  R Gluteals (prone): (5/5): grossly 4/5  L Gluteals (prone): (5/5): grossly 4-/5  R Gluteals (sidelying): (5/5): 4/5  L Gluteals (sidelying): (5/5): 4-/5    KNEE  Knee AROM  R knee flexion: (140°): 130  L knee flexion: (140°): 97  R knee extension: (0°): 0  L knee extension: (0°): -3  Knee MMT  R knee flexion: (5/5): 5/5  L knee flexion: (5/5): 4/5  R knee extension: (5/5): 5/5  L knee extension: (5/5): 4-/5  Outcome Measures:    Other Measures  5x Sit to Stand: 18 sec (requires use of UE - L leg forward)  Lower Extremity Funtional Score (LEFS): 45 (56.2%)    EDUCATION: home exercise program, plan of care, activity modifications, pain management, and injury pathology  Outpatient Education  Individual(s) Educated: Patient  Education Provided: Home Exercise Program  Plan of Care Discussed and Agreed Upon: yes  Patient Response to Education: Patient/Caregiver Verbalized Understanding of Information, Patient/Caregiver Performed Return Demonstration of Exercises/Activities,  Patient/Caregiver Asked Appropriate Questions  Education Comment: Instructed to increase HEP to 2 sets of 10 each.    Assessment:  PT Assessment Results: Decreased strength, Decreased range of motion, Decreased endurance, Impaired balance, Decreased mobility, Orthopedic restrictions, Pain  Rehab Prognosis: Good  Assessment Comment: Patient presents s/p L TKA on 4-17-25.  Patient with ROM and strength deficits.  Arrives using SPC with antalgic pattern. Will benefit from therapy to address deficits for goal of returning to gait without assistive device and attending events for grandchildren and light yardwork.    Clinical Presentation: Stable and/or uncomplicated characteristics  Personal Factors: None    Plan:  Treatment/Interventions: Education/ Instruction, Gait training, Manual therapy, Neuromuscular re-education, Self care/ home management, Taping techniques, Therapeutic activities, Therapeutic exercises  PT Plan: Skilled PT  PT Frequency: 2 times per week  Duration: 8 weeks  Onset Date: 04/17/25  Certification Period Start Date: 05/06/25  Certification Period End Date: 08/04/25  Number of Treatments Authorized: eval/auth  Rehab Potential: Good  Plan of Care Agreement: Patient    Goals: Set and discussed today  Active       PT Problem       PT Goal 1       Start:  05/06/25    Expected End:  08/04/25       STG  1) Patient will improve LEFS score by 9 points in order to perform functional activities at home and in the community in 4 weeks.  2) Patient will be able to complete ADLs with pain in knee less than 2/10 in 4 weeks.  3) Pt will improve knee LEFT ROM to 0-105 degrees to be able to complete ADLs with less difficulty in 4 weeks.   4) Patient will be independent with HEP to allow for continued improvement in daily tasks at home and in the community in 3 visits.   5) Patient will perform 5 x sit to stand without UE support in 12 seconds in 4 weeks to demonstrate LE strength gains and ease of transfers in 4  weeks.   LTG  1) Patient will have >/=5/5 strength in hip musculature to aid in balance with ambulation on varied surfaces in community in 8 weeks  2) Patient will be able to perform stair climbing in reciprocal manner without pain and light rail use for safe community access in 8 weeks.  3) Patient will be able to walk for up to 30 minutes with minimal gait deviation and no pain for community access in 8 weeks.   4) Patient will improve LEFS score >/=65/80 points in order to perform functional activities at home and in the community by discharge.              Plan of care was developed with input and agreement by the patient    Treatments:  Therapeutic Exercise  Therapeutic Exercise Activity 1: SLR w/QS x 10  Therapeutic Exercise Activity 2: Bridges x 10  Therapeutic Exercise Activity 3: mini-squats x 10  Review of current HEP: HR, QS, SLR, Supine hip abd, standing hip flex/abd/ext, seated marches, mini squats x 10 - BID    Ambulatory Screenings Summary       Screening  Frequency  Date Last Completed   Spiritual and Cultural Beliefs   Screening  each visit or episode of care 4/4/2025   Falls Risk Screening  every ambulatory visit    Pain Screening  annually at primary care visit  4/4/2025   Domestic Violence screening  annually at primary care visit 4/4/2025   Elder Abuse Screening  annually at primary care visit    Depression Screening  annually in the primary care setting 4/4/2025   Suicide Risk Screening  annually in the primary care setting    Nutrition and Food Insecurity   Screening  at least annually at primary care visit     Key Learner  annually in the primary care setting 4/4/2025   Drug Screen  4/4/2025  8:10 AM   Alcohol Screen  4/4/2025  8:10 AM   Advance Directive  4/4/2025         Time Calculation  Start Time: 0915  Stop Time: 1000  Time Calculation (min): 45 min  PT Evaluation Time Entry  PT Evaluation (Low) Time Entry: 33, PT Therapeutic Procedures Time Entry  Therapeutic Exercise Time Entry: 10,

## 2025-05-08 ENCOUNTER — TREATMENT (OUTPATIENT)
Dept: PHYSICAL THERAPY | Facility: CLINIC | Age: 70
End: 2025-05-08
Payer: MEDICARE

## 2025-05-08 DIAGNOSIS — R26.2 DIFFICULTY WALKING: ICD-10-CM

## 2025-05-08 DIAGNOSIS — Z96.652 PRESENCE OF LEFT ARTIFICIAL KNEE JOINT: ICD-10-CM

## 2025-05-08 DIAGNOSIS — M17.12 LOCALIZED OSTEOARTHRITIS OF LEFT KNEE: Primary | ICD-10-CM

## 2025-05-08 PROCEDURE — 97110 THERAPEUTIC EXERCISES: CPT | Mod: GP,CQ

## 2025-05-08 PROCEDURE — 97140 MANUAL THERAPY 1/> REGIONS: CPT | Mod: GP,CQ

## 2025-05-08 ASSESSMENT — PAIN - FUNCTIONAL ASSESSMENT: PAIN_FUNCTIONAL_ASSESSMENT: 0-10

## 2025-05-08 ASSESSMENT — PAIN SCALES - GENERAL: PAINLEVEL_OUTOF10: 2

## 2025-05-08 NOTE — PROGRESS NOTES
Physical Therapy Treatment    Patient Name: Dustin Kenny  MRN: [id number]  Today's Date: 5/8/2025    Current Problem  Problem List Items Addressed This Visit           ICD-10-CM    Localized osteoarthritis of left knee - Primary M17.12    Difficulty walking R26.2    Presence of left artificial knee joint Z96.652       Insurance:  Payor: [employer] MEDICARE / Plan: [employer] HE[employer] MEDICARE / Product Type: *No Product type* /   Number of Treatments Authorized: 2/?  auth  Certification Period Start Date: 05/06/25  Certification Period End Date: 08/04/25    Subjective   General  Reason for Referral: L TKA   DOS 4/17/25  Referred By: MD Yusef  Past Medical History Relevant to Rehab: HTN, OA, Hearing aides  General Comment: PT STATES THE ONLY PAIN HE HAS IS ACROSS HIS KNEE CAP.  HEP GOING WELL. PT 3 WEEKS POST OP TODAY    Performing HEP?: Yes    Precautions  Precautions  Precautions Comment: Standard TKA precautions.  Pain  Pain Assessment: 0-10  0-10 (Numeric) Pain Score: 2  Pain Type: Surgical pain  Pain Location: Knee  Pain Orientation: Left    Objective   General Observation  General Observation: L KNEE ROM 1* - 114* (PT AMB WITH CANE)    Treatments:    Therapeutic Exercise  Therapeutic Exercise Activity 1: SCIFIT ROM SEAT 13 X 6 MIN  Therapeutic Exercise Activity 2: GASTROC STRETCH X 1 MIN  Therapeutic Exercise Activity 3: HEEL RAISES X 1 MIN  Therapeutic Exercise Activity 4: MINI SQUATS X 1 MIN  Therapeutic Exercise Activity 5: HEEL SLIDES WITH STRAP WITH QS X 5 MIN  Therapeutic Exercise Activity 6: SLR X 1 MIN  Therapeutic Exercise Activity 7: SAQ X 1 MIN         Manual Therapy  Manual Therapy Activity 1: L PF MOBS  Manual Therapy Activity 2: STM L QUAD, IT BAND, KNEE, GASTROC, VASQUES  Manual Therapy Activity 3: PROM L KNEE FLEX, EXT                        OP EDUCATION:  Outpatient Education  Education Comment: CONTINUE WITH CURRENT HEP    Assessment:  PT Assessment  Assessment Comment: PT KIRAN EX'S WELL.   NEEDED TO WORK ON HIS SQUAT FORM QUITE A BIT.  HAS A TENDENCY TO JUST BEND HIS KNEES AND KEEP MORE WT ON HIS R LE.  INCREASED KNEE ROM SINCE LAST VISIT. PT STILL DISPLAYS MIN SWELLING IN HIS L KNEE.    Plan:  OP PT Plan  Treatment/Interventions: Education/ Instruction, Gait training, Manual therapy, Neuromuscular re-education, Self care/ home management, Taping techniques, Therapeutic activities, Therapeutic exercises  PT Plan: Skilled PT  PT Frequency: 2 times per week  Duration: 8 weeks  Onset Date: 04/17/25  Certification Period Start Date: 05/06/25  Certification Period End Date: 08/04/25  Number of Treatments Authorized: 2/?  auth  Rehab Potential: Good  Plan of Care Agreement: Patient    Goals:  Active       PT Problem       PT Goal 1       Start:  05/06/25    Expected End:  08/04/25       STG  1) Patient will improve LEFS score by 9 points in order to perform functional activities at home and in the community in 4 weeks.  2) Patient will be able to complete ADLs with pain in knee less than 2/10 in 4 weeks.  3) Pt will improve knee LEFT ROM to 0-105 degrees to be able to complete ADLs with less difficulty in 4 weeks.   4) Patient will be independent with HEP to allow for continued improvement in daily tasks at home and in the community in 3 visits.   5) Patient will perform 5 x sit to stand without UE support in 12 seconds in 4 weeks to demonstrate LE strength gains and ease of transfers in 4 weeks.   LTG  1) Patient will have >/=5/5 strength in hip musculature to aid in balance with ambulation on varied surfaces in community in 8 weeks  2) Patient will be able to perform stair climbing in reciprocal manner without pain and light rail use for safe community access in 8 weeks.  3) Patient will be able to walk for up to 30 minutes with minimal gait deviation and no pain for community access in 8 weeks.   4) Patient will improve LEFS score >/=65/80 points in order to perform functional activities at home and in  the community by discharge.               Time Calculation  Start Time: 0950  Stop Time: 1028  Time Calculation (min): 38 min  PT Therapeutic Procedures Time Entry  Manual Therapy Time Entry: 18  Therapeutic Exercise Time Entry: 20,

## 2025-05-10 DIAGNOSIS — M54.50 LOW BACK PAIN, UNSPECIFIED BACK PAIN LATERALITY, UNSPECIFIED CHRONICITY, UNSPECIFIED WHETHER SCIATICA PRESENT: ICD-10-CM

## 2025-05-12 RX ORDER — CYCLOBENZAPRINE HCL 10 MG
10 TABLET ORAL NIGHTLY PRN
Qty: 60 TABLET | Refills: 0 | OUTPATIENT
Start: 2025-05-12 | End: 2025-07-11

## 2025-05-13 ENCOUNTER — TREATMENT (OUTPATIENT)
Dept: PHYSICAL THERAPY | Facility: CLINIC | Age: 70
End: 2025-05-13
Payer: MEDICARE

## 2025-05-13 DIAGNOSIS — M17.12 UNILATERAL PRIMARY OSTEOARTHRITIS, LEFT KNEE: ICD-10-CM

## 2025-05-13 DIAGNOSIS — R26.2 DIFFICULTY WALKING: ICD-10-CM

## 2025-05-13 DIAGNOSIS — Z96.652 PRESENCE OF LEFT ARTIFICIAL KNEE JOINT: ICD-10-CM

## 2025-05-13 DIAGNOSIS — Z96.652 HISTORY OF TOTAL LEFT KNEE REPLACEMENT: ICD-10-CM

## 2025-05-13 DIAGNOSIS — M17.12 LOCALIZED OSTEOARTHRITIS OF LEFT KNEE: Primary | ICD-10-CM

## 2025-05-13 PROCEDURE — 97140 MANUAL THERAPY 1/> REGIONS: CPT | Mod: GP | Performed by: PHYSICAL THERAPIST

## 2025-05-13 PROCEDURE — 97110 THERAPEUTIC EXERCISES: CPT | Mod: GP | Performed by: PHYSICAL THERAPIST

## 2025-05-13 ASSESSMENT — PAIN - FUNCTIONAL ASSESSMENT: PAIN_FUNCTIONAL_ASSESSMENT: 0-10

## 2025-05-13 ASSESSMENT — PAIN SCALES - GENERAL: PAINLEVEL_OUTOF10: 8

## 2025-05-15 ENCOUNTER — TREATMENT (OUTPATIENT)
Dept: PHYSICAL THERAPY | Facility: CLINIC | Age: 70
End: 2025-05-15
Payer: MEDICARE

## 2025-05-15 DIAGNOSIS — M17.12 LOCALIZED OSTEOARTHRITIS OF LEFT KNEE: Primary | ICD-10-CM

## 2025-05-15 DIAGNOSIS — M17.12 UNILATERAL PRIMARY OSTEOARTHRITIS, LEFT KNEE: ICD-10-CM

## 2025-05-15 DIAGNOSIS — Z96.652 PRESENCE OF LEFT ARTIFICIAL KNEE JOINT: ICD-10-CM

## 2025-05-15 DIAGNOSIS — Z96.652 HISTORY OF TOTAL LEFT KNEE REPLACEMENT: ICD-10-CM

## 2025-05-15 DIAGNOSIS — R26.2 DIFFICULTY WALKING: ICD-10-CM

## 2025-05-15 PROCEDURE — 97110 THERAPEUTIC EXERCISES: CPT | Mod: GP | Performed by: PHYSICAL THERAPIST

## 2025-05-15 PROCEDURE — 97140 MANUAL THERAPY 1/> REGIONS: CPT | Mod: GP | Performed by: PHYSICAL THERAPIST

## 2025-05-15 ASSESSMENT — PAIN SCALES - GENERAL: PAINLEVEL_OUTOF10: 4

## 2025-05-15 ASSESSMENT — PAIN - FUNCTIONAL ASSESSMENT: PAIN_FUNCTIONAL_ASSESSMENT: 0-10

## 2025-05-15 NOTE — PROGRESS NOTES
Physical Therapy Treatment    Patient Name: Dustin Kenny  MRN: [id number]  Today's Date: 5/15/2025    Current Problem  Problem List Items Addressed This Visit           ICD-10-CM    Unilateral primary osteoarthritis, left knee - Primary M17.12    Difficulty walking R26.2    History of total left knee replacement Z96.652       Insurance:  Payor: [employer] MEDICARE / Plan: UNITED HE[employer] MEDICARE / Product Type: *No Product type* /   Number of Treatments Authorized: 3/12  authorized  Certification Period Start Date: 05/06/25  Certification Period End Date: 08/04/25    Subjective   General  Reason for Referral: L TKA   DOS 4/17/25  Referred By: MD Yusef  Past Medical History Relevant to Rehab: HTN, OA, Hearing aides  General Comment: Feels like a band is around the left knee. Knee cap continues to be painful with stepping, transfers.    Performing HEP?: Yes    Precautions  Precautions  Precautions Comment: Standard TKA precautions.  Pain  Pain Assessment: 0-10  0-10 (Numeric) Pain Score: 4  Pain Type: Surgical pain  Pain Location: Knee  Pain Orientation: Left    Objective     General Observation  General Observation: L KNEE ROM 1* - 110* (PT AMB WITH CANE)    Treatments:    Therapeutic Exercise  Therapeutic Exercise Activity 1: SCIFIT ROM SEAT 13 to 11 X 6 MIN  Therapeutic Exercise Activity 2: GASTROC STRETCH X 1 MIN  Therapeutic Exercise Activity 3: HEEL RAISES X 1 MIN  Therapeutic Exercise Activity 4: Seated knee flexion w/sliders x 1 min  Therapeutic Exercise Activity 6: Dynamics: marches, [place], butt kicks 20 ft x 2 - ea  Therapeutic Exercise Activity 7: TG level 5: 2 x 1 min  Therapeutic Exercise Activity 8: SLR x 1 min     Manual Therapy  Manual Therapy Activity 1: L PF mobs  Manual Therapy Activity 2: STM  L quad, ITB  Manual Therapy Activity 3: PROM L knee    OP EDUCATION:  Outpatient Education  Education Comment: CONTINUE WITH CURRENT HEP    Assessment:  PT Assessment  Assessment Comment:  L quad tightness and tenderness. Instructed to stop SLR x a few days given quad tightness and patellar pain. Demonstrating good SLR quality. Incision with 2 locations of suture redness - to monitor.    Plan:  OP PT Plan  Treatment/Interventions: Education/ Instruction, Gait training, Manual therapy, Neuromuscular re-education, Self care/ home management, Taping techniques, Therapeutic activities, Therapeutic exercises  PT Plan: Skilled PT  PT Frequency: 2 times per week  Duration: 8 weeks  Onset Date: 04/17/25  Certification Period Start Date: 05/06/25  Certification Period End Date: 08/04/25  Number of Treatments Authorized: 3/12  authorized  Rehab Potential: Good  Plan of Care Agreement: Patient  Monitor response to therapy. Progress to tolerance.   Goals:  Active       PT Problem       PT Goal 1       Start:  05/06/25    Expected End:  08/04/25       STG  1) Patient will improve LEFS score by 9 points in order to perform functional activities at home and in the community in 4 weeks.  2) Patient will be able to complete ADLs with pain in knee less than 2/10 in 4 weeks.  3) Pt will improve knee LEFT ROM to 0-105 degrees to be able to complete ADLs with less difficulty in 4 weeks.   4) Patient will be independent with HEP to allow for continued improvement in daily tasks at home and in the community in 3 visits.   5) Patient will perform 5 x sit to stand without UE support in 12 seconds in 4 weeks to demonstrate LE strength gains and ease of transfers in 4 weeks.   LTG  1) Patient will have >/=5/5 strength in hip musculature to aid in balance with ambulation on varied surfaces in community in 8 weeks  2) Patient will be able to perform stair climbing in reciprocal manner without pain and light rail use for safe community access in 8 weeks.  3) Patient will be able to walk for up to 30 minutes with minimal gait deviation and no pain for community access in 8 weeks.   4) Patient will improve LEFS score >/=65/80 points  in order to perform functional activities at home and in the community by discharge.               Time Calculation  Start Time: 1045  Stop Time: 1130  Time Calculation (min): 45 min  PT Therapeutic Procedures Time Entry  Manual Therapy Time Entry: 12  Therapeutic Exercise Time Entry: 28,

## 2025-05-20 ENCOUNTER — TREATMENT (OUTPATIENT)
Dept: PHYSICAL THERAPY | Facility: CLINIC | Age: 70
End: 2025-05-20
Payer: MEDICARE

## 2025-05-20 DIAGNOSIS — R26.2 DIFFICULTY WALKING: ICD-10-CM

## 2025-05-20 DIAGNOSIS — M17.12 UNILATERAL PRIMARY OSTEOARTHRITIS, LEFT KNEE: ICD-10-CM

## 2025-05-20 DIAGNOSIS — Z96.652 PRESENCE OF LEFT ARTIFICIAL KNEE JOINT: ICD-10-CM

## 2025-05-20 DIAGNOSIS — M17.12 LOCALIZED OSTEOARTHRITIS OF LEFT KNEE: Primary | ICD-10-CM

## 2025-05-20 DIAGNOSIS — Z96.652 HISTORY OF TOTAL LEFT KNEE REPLACEMENT: ICD-10-CM

## 2025-05-20 PROCEDURE — 97110 THERAPEUTIC EXERCISES: CPT | Mod: GP,CQ

## 2025-05-20 PROCEDURE — 97140 MANUAL THERAPY 1/> REGIONS: CPT | Mod: GP,CQ

## 2025-05-20 ASSESSMENT — PAIN SCALES - GENERAL: PAINLEVEL_OUTOF10: 9

## 2025-05-20 ASSESSMENT — PAIN - FUNCTIONAL ASSESSMENT: PAIN_FUNCTIONAL_ASSESSMENT: 0-10

## 2025-05-20 NOTE — PROGRESS NOTES
"Physical Therapy Treatment    Patient Name: Dustin Kenny  MRN: [id number]  Today's Date: 5/20/2025    Current Problem  Problem List Items Addressed This Visit           ICD-10-CM    Unilateral primary osteoarthritis, left knee - Primary M17.12    Difficulty walking R26.2    History of total left knee replacement Z96.652       Insurance:  Payor: [employer] MEDICARE / Plan: [employer] MEDICARE / Product Type: *No Product type* /   Number of Treatments Authorized: 5/12  authorized  Certification Period Start Date: 05/06/25  Certification Period End Date: 07/01/25    Subjective   General  Reason for Referral: L TKA   DOS 4/17/25  Referred By: MD Yusef  Past Medical History Relevant to Rehab: HTN, OA, Hearing aides  General Comment: PT STATES HIS L KNEE IS HURTING THIS MORNING. THE PAIN IS STILL THE KNEE CAP AREA.  HE DID SOME HOUSE CLEANING AND A LITTLE YARD WORK YESTERDAY.  HE HAS BEEN DOING HIS HEP AND ICING. PT IS 4.5 WEEKS POST OP.    Performing HEP?: Yes    Precautions  Precautions  Precautions Comment: Standard TKA precautions.  Pain  Pain Assessment: 0-10  0-10 (Numeric) Pain Score: 9  Pain Location: Knee  Pain Orientation: Left    Objective   General Observation  General Observation: L KNEE ROM 1* - 110* (PT AMB WITH CANE)    Treatments:    Therapeutic Exercise  Therapeutic Exercise Activity 1: SCIFIT ROM SEAT 13 to 11 X 10 MIN  Therapeutic Exercise Activity 2: GASTROC STRETCH X 1 MIN  Therapeutic Exercise Activity 3: 12\" STEP L KNEE SELF FLEX MOBS X 3 MIN         Manual Therapy  Manual Therapy Activity 1: L PF mobs  Manual Therapy Activity 2: STM L quad, ITB, SHIN, HAM , GASTROC, KNEE AVOIDING SCAR                        OP EDUCATION:  Outpatient Education  Education Comment: CONTINUE WITH CURRENT HEP    Assessment:  PT Assessment  Assessment Comment: FOCUSED TODAY'S VISIT ON DECREASING PAIN AND INCREASING FLEXIBILITY.  PT WAS TIGHT AND TENDER IN HIS R QUAD, VASQUES, M/L KNEE.  HE FELT MUCH " BETTER AFTER MANUAL AND HAD 2/10 PAIN WHEN HE LEFT TODAY'S SESSION.    Plan:  OP PT Plan  Treatment/Interventions: Education/ Instruction, Gait training, Manual therapy, Neuromuscular re-education, Self care/ home management, Taping techniques, Therapeutic activities, Therapeutic exercises  PT Plan: Skilled PT  PT Frequency: 2 times per week  Duration: 8 weeks  Onset Date: 04/17/25  Certification Period Start Date: 05/06/25  Certification Period End Date: 07/01/25  Number of Treatments Authorized: 5/12  authorized  Rehab Potential: Good  Plan of Care Agreement: Patient    Goals:  Active       PT Problem       PT Goal 1       Start:  05/06/25    Expected End:  08/04/25       STG  1) Patient will improve LEFS score by 9 points in order to perform functional activities at home and in the community in 4 weeks.  2) Patient will be able to complete ADLs with pain in knee less than 2/10 in 4 weeks.  3) Pt will improve knee LEFT ROM to 0-105 degrees to be able to complete ADLs with less difficulty in 4 weeks.   4) Patient will be independent with HEP to allow for continued improvement in daily tasks at home and in the community in 3 visits.   5) Patient will perform 5 x sit to stand without UE support in 12 seconds in 4 weeks to demonstrate LE strength gains and ease of transfers in 4 weeks.   LTG  1) Patient will have >/=5/5 strength in hip musculature to aid in balance with ambulation on varied surfaces in community in 8 weeks  2) Patient will be able to perform stair climbing in reciprocal manner without pain and light rail use for safe community access in 8 weeks.  3) Patient will be able to walk for up to 30 minutes with minimal gait deviation and no pain for community access in 8 weeks.   4) Patient will improve LEFS score >/=65/80 points in order to perform functional activities at home and in the community by discharge.               Time Calculation  Start Time: 0900  Stop Time: 0940  Time Calculation (min): 40  min  PT Therapeutic Procedures Time Entry  Manual Therapy Time Entry: 25  Therapeutic Exercise Time Entry: 15,

## 2025-05-27 ENCOUNTER — TREATMENT (OUTPATIENT)
Dept: PHYSICAL THERAPY | Facility: CLINIC | Age: 70
End: 2025-05-27
Payer: MEDICARE

## 2025-05-27 DIAGNOSIS — Z96.652 HISTORY OF TOTAL LEFT KNEE REPLACEMENT: ICD-10-CM

## 2025-05-27 DIAGNOSIS — M17.12 LOCALIZED OSTEOARTHRITIS OF LEFT KNEE: Primary | ICD-10-CM

## 2025-05-27 DIAGNOSIS — Z96.652 PRESENCE OF LEFT ARTIFICIAL KNEE JOINT: ICD-10-CM

## 2025-05-27 DIAGNOSIS — M17.12 UNILATERAL PRIMARY OSTEOARTHRITIS, LEFT KNEE: ICD-10-CM

## 2025-05-27 DIAGNOSIS — R26.2 DIFFICULTY WALKING: ICD-10-CM

## 2025-05-27 PROCEDURE — 97112 NEUROMUSCULAR REEDUCATION: CPT | Mod: GP | Performed by: PHYSICAL THERAPIST

## 2025-05-27 PROCEDURE — 97110 THERAPEUTIC EXERCISES: CPT | Mod: GP | Performed by: PHYSICAL THERAPIST

## 2025-05-27 ASSESSMENT — PAIN SCALES - GENERAL: PAINLEVEL_OUTOF10: 2

## 2025-05-27 ASSESSMENT — PAIN - FUNCTIONAL ASSESSMENT: PAIN_FUNCTIONAL_ASSESSMENT: 0-10

## 2025-05-27 NOTE — PROGRESS NOTES
"Physical Therapy Treatment    Patient Name: Dustin Kenny  MRN: [id number]  Today's Date: 5/27/2025    Current Problem  Problem List Items Addressed This Visit           ICD-10-CM    Unilateral primary osteoarthritis, left knee - Primary M17.12    Difficulty walking R26.2    History of total left knee replacement Z96.652       Insurance:  Payor: [employer] / Plan: [employer] / Product Type: *No Product type* /   Number of Treatments Authorized: 6/12  authorized  Certification Period Start Date: 05/06/25  Certification Period End Date: 07/01/25    Subjective   General  Reason for Referral: L TKA   DOS 4/17/25  Referred By: MD Yusef  Past Medical History Relevant to Rehab: HTN, OA, Hearing aides  General Comment: Did ok over the weekend re: pain.  Knee  continues to be the primary pain issue.    Performing HEP?: Yes    Precautions  Precautions  Precautions Comment: Standard TKA precautions.  Pain  Pain Assessment: 0-10  0-10 (Numeric) Pain Score: 2  Pain Type: Surgical pain  Pain Location: Knee  Pain Orientation: Left    Objective     General Observation  General Observation: L KNEE ROM 1* - 110* (PT AMB WITH CANE)    Treatments:    Therapeutic Exercise  Therapeutic Exercise Activity 1: SCIFIT:  level 2.5 - seat 12 x 6 min.  Therapeutic Exercise Activity 2: GASTROC STRETCH X 1 MIN  Therapeutic Exercise Activity 3: HEEL RAISES X 1 MIN  Therapeutic Exercise Activity 4: Leg curl: 40# - 2 x 10  Therapeutic Exercise Activity 5: Leg ext: 40# 2 x 10  Therapeutic Exercise Activity 6: Dynamics: marches, [place], butt kicks 20 ft x 2 - ea  Therapeutic Exercise Activity 7: TG level 5: 2 x 1 min  Therapeutic Exercise Activity 8: TG level 3 - single leg x 10 - L/R  Therapeutic Exercise Activity 9: Forward step ups: 6\" - x 10 - B  Therapeutic Exercise Activity 10: Side steps ups: 6\" x 10 - B    Balance/Neuromuscular Re-Education  Balance/Neuromuscular Re-Education Activity 1: AirEx beam: side " steps x 2 min  Balance/Neuromuscular Re-Education Activity 2: Rocker board: 1 min side/side - 1 min FWD/BWD  Balance/Neuromuscular Re-Education Activity 3: AirEx Pad: Static standing with head turns x 1 min    Manual Therapy  Manual Therapy Activity 1: L PF mobs  Manual Therapy Activity 2: STM of L quad, ITB      OP EDUCATION:  Outpatient Education  Education Comment: CONTINUE WITH CURRENT HEP    Assessment:  PT Assessment  Assessment Comment: Able to add leg curl  and leg extension without increased pain. No problem with single leg work added today. Swelling remains.  Will benefit from continued therapy towards goals.    Plan:  OP PT Plan  Treatment/Interventions: Education/ Instruction, Gait training, Manual therapy, Neuromuscular re-education, Self care/ home management, Taping techniques, Therapeutic activities, Therapeutic exercises  PT Plan: Skilled PT  PT Frequency: 2 times per week  Duration: 8 weeks  Onset Date: 04/17/25  Certification Period Start Date: 05/06/25  Certification Period End Date: 07/01/25  Number of Treatments Authorized: 6/12  authorized  Rehab Potential: Good  Plan of Care Agreement: Patient    Goals:  Active       PT Problem       PT Goal 1       Start:  05/06/25    Expected End:  08/04/25       STG  1) Patient will improve LEFS score by 9 points in order to perform functional activities at home and in the community in 4 weeks.  2) Patient will be able to complete ADLs with pain in knee less than 2/10 in 4 weeks.  3) Pt will improve knee LEFT ROM to 0-105 degrees to be able to complete ADLs with less difficulty in 4 weeks.   4) Patient will be independent with HEP to allow for continued improvement in daily tasks at home and in the community in 3 visits.   5) Patient will perform 5 x sit to stand without UE support in 12 seconds in 4 weeks to demonstrate LE strength gains and ease of transfers in 4 weeks.   LTG  1) Patient will have >/=5/5 strength in hip musculature to aid in balance with  ambulation on varied surfaces in community in 8 weeks  2) Patient will be able to perform stair climbing in reciprocal manner without pain and light rail use for safe community access in 8 weeks.  3) Patient will be able to walk for up to 30 minutes with minimal gait deviation and no pain for community access in 8 weeks.   4) Patient will improve LEFS score >/=65/80 points in order to perform functional activities at home and in the community by discharge.               Time Calculation  Start Time: 0915  Stop Time: 1000  Time Calculation (min): 45 min  PT Therapeutic Procedures Time Entry  Manual Therapy Time Entry: 6  Neuromuscular Re-Education Time Entry: 8  Therapeutic Exercise Time Entry: 25,

## 2025-05-29 ENCOUNTER — TREATMENT (OUTPATIENT)
Dept: PHYSICAL THERAPY | Facility: CLINIC | Age: 70
End: 2025-05-29
Payer: MEDICARE

## 2025-05-29 DIAGNOSIS — M17.12 LOCALIZED OSTEOARTHRITIS OF LEFT KNEE: Primary | ICD-10-CM

## 2025-05-29 DIAGNOSIS — Z96.652 PRESENCE OF LEFT ARTIFICIAL KNEE JOINT: ICD-10-CM

## 2025-05-29 DIAGNOSIS — M17.12 UNILATERAL PRIMARY OSTEOARTHRITIS, LEFT KNEE: ICD-10-CM

## 2025-05-29 DIAGNOSIS — Z96.652 HISTORY OF TOTAL LEFT KNEE REPLACEMENT: ICD-10-CM

## 2025-05-29 DIAGNOSIS — R26.2 DIFFICULTY WALKING: ICD-10-CM

## 2025-05-29 PROCEDURE — 97140 MANUAL THERAPY 1/> REGIONS: CPT | Mod: GP | Performed by: PHYSICAL THERAPIST

## 2025-05-29 PROCEDURE — 97110 THERAPEUTIC EXERCISES: CPT | Mod: GP | Performed by: PHYSICAL THERAPIST

## 2025-05-29 ASSESSMENT — PAIN - FUNCTIONAL ASSESSMENT: PAIN_FUNCTIONAL_ASSESSMENT: 0-10

## 2025-05-29 ASSESSMENT — PAIN SCALES - GENERAL: PAINLEVEL_OUTOF10: 2

## 2025-05-29 NOTE — PROGRESS NOTES
Physical Therapy Treatment    Patient Name: Dustin Kenny  MRN: [id number]  Today's Date: 5/29/2025    Current Problem  Problem List Items Addressed This Visit           ICD-10-CM    Unilateral primary osteoarthritis, left knee - Primary M17.12    Difficulty walking R26.2    History of total left knee replacement Z96.652       Insurance:  Payor: [employer] / Plan: [employer] / Product Type: *No Product type* /   Number of Treatments Authorized: 7/12  authorized  Certification Period Start Date: 05/06/25  Certification Period End Date: 07/01/25    Subjective   General  Reason for Referral: L TKA   DOS 4/17/25  Referred By: MD Yusef  Past Medical History Relevant to Rehab: HTN, OA, Hearing aides  General Comment: Subjectively 75% improved. Knee cap remains painful.    Performing HEP?: Yes    Precautions  Precautions  Precautions Comment: Standard TKA precautions.  Pain  Pain Assessment: 0-10  0-10 (Numeric) Pain Score: 2  Pain Type: Surgical pain  Pain Location: Knee  Pain Orientation: Left    Objective     General Observation  General Observation: L KNEE ROM 0 - 120* (PT AMB WITH CANE)    Treatments:    Therapeutic Exercise  Therapeutic Exercise Activity 1: SCIFIT:  level 3 - seat 12 x 6 min.  Therapeutic Exercise Activity 2: GASTROC STRETCH X 1 MIN  Therapeutic Exercise Activity 3: HEEL RAISES X 1 MIN  Therapeutic Exercise Activity 4: Standing hamstring stretch: 1 min ea  Therapeutic Exercise Activity 5: Dynamics: marches, butt kick, [place], side steps, retro sweeps - 20 ft x 2 ea  Therapeutic Exercise Activity 6: Forward step ups: 2 x 10 - B  Therapeutic Exercise Activity 7: Side step ups: x 10 - L/R.  Therapeutic Exercise Activity 8: TG squats - Level 6 - 2  x 1 min  Therapeutic Exercise Activity 9: TG single leg squats: level 4 x 10 - L/R  Therapeutic Exercise Activity 10: Leg ext: 40#'s 2 x 10  Therapeutic Exercise Activity 11: Leg curl: 40#  x 10     Manual  Therapy  Manual Therapy Activity 1: L PF mobs  Manual Therapy Activity 2: STM of L quad, ITB,  shin and scar tissue mobility    OP EDUCATION:  Outpatient Education  Education Comment: Performing dynamics to HEP - perform sit to stand vs minisquats.    Assessment:  PT Assessment  Assessment Comment: Patient continues to rely on SPC for ambulation.  Not able to perform dynamics without SPC support. Demonstrating good ROM gains. Will benefit from continued therapy towards goals.    Plan:  OP PT Plan  Treatment/Interventions: Education/ Instruction, Gait training, Manual therapy, Neuromuscular re-education, Self care/ home management, Taping techniques, Therapeutic activities, Therapeutic exercises  PT Plan: Skilled PT  PT Frequency: 2 times per week  Duration: 8 weeks  Onset Date: 04/17/25  Certification Period Start Date: 05/06/25  Certification Period End Date: 07/01/25  Number of Treatments Authorized: 7/12  authorized  Rehab Potential: Good  Plan of Care Agreement: Patient    Goals:  Active       PT Problem       PT Goal 1       Start:  05/06/25    Expected End:  08/04/25       STG  1) Patient will improve LEFS score by 9 points in order to perform functional activities at home and in the community in 4 weeks.  2) Patient will be able to complete ADLs with pain in knee less than 2/10 in 4 weeks.  3) Pt will improve knee LEFT ROM to 0-105 degrees to be able to complete ADLs with less difficulty in 4 weeks.   4) Patient will be independent with HEP to allow for continued improvement in daily tasks at home and in the community in 3 visits.   5) Patient will perform 5 x sit to stand without UE support in 12 seconds in 4 weeks to demonstrate LE strength gains and ease of transfers in 4 weeks.   LTG  1) Patient will have >/=5/5 strength in hip musculature to aid in balance with ambulation on varied surfaces in community in 8 weeks  2) Patient will be able to perform stair climbing in reciprocal manner without pain and  light rail use for safe community access in 8 weeks.  3) Patient will be able to walk for up to 30 minutes with minimal gait deviation and no pain for community access in 8 weeks.   4) Patient will improve LEFS score >/=65/80 points in order to perform functional activities at home and in the community by discharge.               Time Calculation  Start Time: 0915  Stop Time: 1013  Time Calculation (min): 58 min  PT Therapeutic Procedures Time Entry  Manual Therapy Time Entry: 15  Therapeutic Exercise Time Entry: 38,

## 2025-06-02 DIAGNOSIS — F41.9 ANXIETY: ICD-10-CM

## 2025-06-02 DIAGNOSIS — M54.50 LOW BACK PAIN, UNSPECIFIED BACK PAIN LATERALITY, UNSPECIFIED CHRONICITY, UNSPECIFIED WHETHER SCIATICA PRESENT: ICD-10-CM

## 2025-06-02 RX ORDER — CYCLOBENZAPRINE HCL 10 MG
10 TABLET ORAL NIGHTLY PRN
Qty: 60 TABLET | Refills: 0 | Status: SHIPPED | OUTPATIENT
Start: 2025-06-02 | End: 2025-08-01

## 2025-06-02 RX ORDER — HYDROXYZINE HYDROCHLORIDE 25 MG/1
25 TABLET, FILM COATED ORAL 2 TIMES DAILY
Qty: 60 TABLET | Refills: 0 | Status: SHIPPED | OUTPATIENT
Start: 2025-06-02

## 2025-06-11 ENCOUNTER — TREATMENT (OUTPATIENT)
Dept: PHYSICAL THERAPY | Facility: CLINIC | Age: 70
End: 2025-06-11
Payer: MEDICARE

## 2025-06-11 DIAGNOSIS — R26.2 DIFFICULTY WALKING: ICD-10-CM

## 2025-06-11 DIAGNOSIS — Z96.652 PRESENCE OF LEFT ARTIFICIAL KNEE JOINT: ICD-10-CM

## 2025-06-11 DIAGNOSIS — M17.12 UNILATERAL PRIMARY OSTEOARTHRITIS, LEFT KNEE: ICD-10-CM

## 2025-06-11 DIAGNOSIS — M17.12 LOCALIZED OSTEOARTHRITIS OF LEFT KNEE: Primary | ICD-10-CM

## 2025-06-11 DIAGNOSIS — Z96.652 HISTORY OF TOTAL LEFT KNEE REPLACEMENT: ICD-10-CM

## 2025-06-11 PROCEDURE — 97112 NEUROMUSCULAR REEDUCATION: CPT | Mod: GP,CQ

## 2025-06-11 PROCEDURE — 97110 THERAPEUTIC EXERCISES: CPT | Mod: GP,CQ

## 2025-06-11 ASSESSMENT — PAIN - FUNCTIONAL ASSESSMENT: PAIN_FUNCTIONAL_ASSESSMENT: 0-10

## 2025-06-11 ASSESSMENT — PAIN SCALES - GENERAL: PAINLEVEL_OUTOF10: 1

## 2025-06-11 NOTE — PROGRESS NOTES
Physical Therapy Treatment    Patient Name: Dustin Kenny  MRN: [id number]  Today's Date: 6/11/2025    Current Problem  Problem List Items Addressed This Visit           ICD-10-CM    Unilateral primary osteoarthritis, left knee - Primary M17.12    Difficulty walking R26.2    History of total left knee replacement Z96.652       Insurance:  Payor: [employer] / Plan: [employer]ICARE / Product Type: *No Product type* /   Number of Treatments Authorized: 8/12  authorized  Certification Period Start Date: 05/06/25  Certification Period End Date: 07/01/25    Subjective   General  Reason for Referral: L TKA   DOS 4/17/25  Referred By: MD Yusef  Past Medical History Relevant to Rehab: HTN, OA, Hearing aides  General Comment: PT STATES HE SAW MD AND EVERYTHING IS LOOKING GOOD.  PT GETS A TWINGE OF PAIN HERE AND THERE IF HE TURNS INCORRECTLY.  HE STILL IS STIFF IN THE KNEE MAINLY IN THE MORNINGS.  OVERALL HE IS FEELING GOOD.    Performing HEP?: Yes    Precautions  Precautions  Precautions Comment: Standard TKA precautions.  Pain  Pain Assessment: 0-10  0-10 (Numeric) Pain Score: 1  Pain Location: Knee  Pain Orientation: Left    Objective   General Observation  General Observation: L KNEE ROM 0 - 120* (PT AMB WITH CANE)    Treatments:    Therapeutic Exercise  Therapeutic Exercise Activity 1: SCIFIT:  level 3 - x 6 min.  Therapeutic Exercise Activity 2: GASTROC STRETCH X 1 MIN  Therapeutic Exercise Activity 3: HEEL RAISES X 1 MIN  Therapeutic Exercise Activity 4: DYNAMICS TIN SOLDIER, MARCH, BUTT KICK, SIDE LUNGE  Therapeutic Exercise Activity 5: FOOTWORM YELLOW LOOP // BAR X 1 MIN  Therapeutic Exercise Activity 6: TG L7 SQUATS X 2 MIN  Therapeutic Exercise Activity 7: HAM CURLS 50# X 1 MIN  Therapeutic Exercise Activity 8: KNEE EXT 40# X 1 MIN    Balance/Neuromuscular Re-Education  Balance/Neuromuscular Re-Education Activity 1: TILT BOARD BALANCING F/B, S/S X 2 MIN EACH  //  "BAR  Balance/Neuromuscular Re-Education Activity 2: AIREX BEAM SIDE STEP // BAR X 2 MIN  Balance/Neuromuscular Re-Education Activity 3: 6\" FWD STEP UPS 2 X 1 MIN // BAR  Balance/Neuromuscular Re-Education Activity 4: 6\" LAT STEP OVERS X 1 MIN // BAR    Manual Therapy  Manual Therapy Performed: No                        OP EDUCATION:  Outpatient Education  Education Comment: CONTINUE WITH CURRENT HEP    Assessment:  PT Assessment  Assessment Comment: PT KIRAN EX'S WELL.  HE CONTINUES TO SLOWLY PROGRESS WITH HIS STRENGTH, BALANCE AND GAIT.  NO C/O PAIN DURING SESSION.    Plan:  OP PT Plan  Treatment/Interventions: Education/ Instruction, Gait training, Manual therapy, Neuromuscular re-education, Self care/ home management, Taping techniques, Therapeutic activities, Therapeutic exercises  PT Plan: Skilled PT  PT Frequency: 2 times per week  Duration: 8 weeks  Onset Date: 04/17/25  Certification Period Start Date: 05/06/25  Certification Period End Date: 07/01/25  Number of Treatments Authorized: 8/12  authorized  Rehab Potential: Good  Plan of Care Agreement: Patient    Goals:  Active       PT Problem       PT Goal 1       Start:  05/06/25    Expected End:  08/04/25       STG  1) Patient will improve LEFS score by 9 points in order to perform functional activities at home and in the community in 4 weeks.  2) Patient will be able to complete ADLs with pain in knee less than 2/10 in 4 weeks.  3) Pt will improve knee LEFT ROM to 0-105 degrees to be able to complete ADLs with less difficulty in 4 weeks.   4) Patient will be independent with HEP to allow for continued improvement in daily tasks at home and in the community in 3 visits.   5) Patient will perform 5 x sit to stand without UE support in 12 seconds in 4 weeks to demonstrate LE strength gains and ease of transfers in 4 weeks.   LTG  1) Patient will have >/=5/5 strength in hip musculature to aid in balance with ambulation on varied surfaces in community in 8 " weeks  2) Patient will be able to perform stair climbing in reciprocal manner without pain and light rail use for safe community access in 8 weeks.  3) Patient will be able to walk for up to 30 minutes with minimal gait deviation and no pain for community access in 8 weeks.   4) Patient will improve LEFS score >/=65/80 points in order to perform functional activities at home and in the community by discharge.               Time Calculation  Start Time: 1125  Stop Time: 1205  Time Calculation (min): 40 min  PT Therapeutic Procedures Time Entry  Therapeutic Exercise Time Entry: 28  Neuromuscular Re-Education Time Entry: 12,

## 2025-06-17 ENCOUNTER — TREATMENT (OUTPATIENT)
Dept: PHYSICAL THERAPY | Facility: CLINIC | Age: 70
End: 2025-06-17
Payer: MEDICARE

## 2025-06-17 DIAGNOSIS — M17.12 LOCALIZED OSTEOARTHRITIS OF LEFT KNEE: Primary | ICD-10-CM

## 2025-06-17 DIAGNOSIS — Z96.652 PRESENCE OF LEFT ARTIFICIAL KNEE JOINT: ICD-10-CM

## 2025-06-17 DIAGNOSIS — R26.2 DIFFICULTY WALKING: ICD-10-CM

## 2025-06-17 DIAGNOSIS — Z96.652 HISTORY OF TOTAL LEFT KNEE REPLACEMENT: ICD-10-CM

## 2025-06-17 DIAGNOSIS — M17.12 UNILATERAL PRIMARY OSTEOARTHRITIS, LEFT KNEE: ICD-10-CM

## 2025-06-17 PROCEDURE — 97112 NEUROMUSCULAR REEDUCATION: CPT | Mod: GP | Performed by: PHYSICAL THERAPIST

## 2025-06-17 PROCEDURE — 97110 THERAPEUTIC EXERCISES: CPT | Mod: GP | Performed by: PHYSICAL THERAPIST

## 2025-06-17 ASSESSMENT — PAIN SCALES - GENERAL: PAINLEVEL_OUTOF10: 1

## 2025-06-17 ASSESSMENT — PAIN - FUNCTIONAL ASSESSMENT: PAIN_FUNCTIONAL_ASSESSMENT: 0-10

## 2025-06-17 NOTE — PROGRESS NOTES
"Physical Therapy Treatment    Patient Name: Dustin Kenny  MRN: [id number]  Today's Date: 6/17/2025    Current Problem  Problem List Items Addressed This Visit           ICD-10-CM    Unilateral primary osteoarthritis, left knee - Primary M17.12    Difficulty walking R26.2    History of total left knee replacement Z96.652       Insurance:  Payor: [employer] / Plan: [employer]ICARE / Product Type: *No Product type* /   Number of Treatments Authorized: 9/12  authorized  Certification Period Start Date: 05/06/25  Certification Period End Date: 07/01/25    Subjective   General  Reason for Referral: L TKA   DOS 4/17/25  Referred By: MD Yusef  Past Medical History Relevant to Rehab: HTN, OA, Hearing aides  General Comment: Reports 85-90% improved. Reporting occassional pain at knee. Shin pain much less as well as quad pain.    Performing HEP?: Yes    Precautions  Precautions  Precautions Comment: Standard TKA precautions.  Pain  Pain Assessment: 0-10  0-10 (Numeric) Pain Score: 1  Pain Location: Knee  Pain Orientation: Left    Objective   Slight antalgic gait.   Treatments:    Therapeutic Exercise  Therapeutic Exercise Activity 1: SCIFIT:  level 3 - x 6 min.  Therapeutic Exercise Activity 2: GASTROC STRETCH X 1 MIN  Therapeutic Exercise Activity 3: HEEL RAISES X 1 MIN  Therapeutic Exercise Activity 4: DYNAMICS TIN [place]DIER, MARCH, BUTT KICK 20 ft x 2 ea  Therapeutic Exercise Activity 6: TG L7 SQUATS X 2 MIN  Therapeutic Exercise Activity 7: HAM CURLS 50# X 1 MIN  Therapeutic Exercise Activity 8: KNEE EXT 40# X 1 MIN    Balance/Neuromuscular Re-Education  Balance/Neuromuscular Re-Education Activity 1: Step downs x 10 - B  Balance/Neuromuscular Re-Education Activity 2: AirEx Beam side steps 2 x 1 min  Balance/Neuromuscular Re-Education Activity 3: 6\" FWD step ups x 1 min - B  Balance/Neuromuscular Re-Education Activity 4: 6\" ;lateral step ups x 1 min - B    Manual Therapy  Manual Therapy " Performed: No    OP EDUCATION:  Outpatient Education  Education Comment: CONTINUE WITH CURRENT HEP    Assessment:  PT Assessment  Assessment Comment: Challenged with step downs.  Tolerating sessions well. Would like to gain confidence for return to gym exercises for independent strengthening following therapy.    Plan:  OP PT Plan  Treatment/Interventions: Education/ Instruction, Gait training, Manual therapy, Neuromuscular re-education, Self care/ home management, Taping techniques, Therapeutic activities, Therapeutic exercises  PT Plan: Skilled PT  PT Frequency: 2 times per week  Duration: 8 weeks  Onset Date: 04/17/25  Certification Period Start Date: 05/06/25  Certification Period End Date: 07/01/25  Number of Treatments Authorized: 9/12  authorized  Rehab Potential: Good  Plan of Care Agreement: Patient  Re-check next visit.   Perform TM for eventual TM at gym.   Goals:  Active       PT Problem       PT Goal 1       Start:  05/06/25    Expected End:  08/04/25       STG  1) Patient will improve LEFS score by 9 points in order to perform functional activities at home and in the community in 4 weeks.  2) Patient will be able to complete ADLs with pain in knee less than 2/10 in 4 weeks.  3) Pt will improve knee LEFT ROM to 0-105 degrees to be able to complete ADLs with less difficulty in 4 weeks.   4) Patient will be independent with HEP to allow for continued improvement in daily tasks at home and in the community in 3 visits.   5) Patient will perform 5 x sit to stand without UE support in 12 seconds in 4 weeks to demonstrate LE strength gains and ease of transfers in 4 weeks.   LTG  1) Patient will have >/=5/5 strength in hip musculature to aid in balance with ambulation on varied surfaces in community in 8 weeks  2) Patient will be able to perform stair climbing in reciprocal manner without pain and light rail use for safe community access in 8 weeks.  3) Patient will be able to walk for up to 30 minutes with  minimal gait deviation and no pain for community access in 8 weeks.   4) Patient will improve LEFS score >/=65/80 points in order to perform functional activities at home and in the community by discharge.               Time Calculation  Start Time: 1215  Stop Time: 1300  Time Calculation (min): 45 min  PT Therapeutic Procedures Time Entry  Therapeutic Exercise Time Entry: 32  Neuromuscular Re-Education Time Entry: 8,

## 2025-06-19 ENCOUNTER — TREATMENT (OUTPATIENT)
Dept: PHYSICAL THERAPY | Facility: CLINIC | Age: 70
End: 2025-06-19
Payer: MEDICARE

## 2025-06-19 DIAGNOSIS — Z96.652 HISTORY OF TOTAL LEFT KNEE REPLACEMENT: ICD-10-CM

## 2025-06-19 DIAGNOSIS — M17.12 UNILATERAL PRIMARY OSTEOARTHRITIS, LEFT KNEE: ICD-10-CM

## 2025-06-19 DIAGNOSIS — R26.2 DIFFICULTY WALKING: ICD-10-CM

## 2025-06-19 DIAGNOSIS — Z96.652 PRESENCE OF LEFT ARTIFICIAL KNEE JOINT: ICD-10-CM

## 2025-06-19 DIAGNOSIS — M17.12 LOCALIZED OSTEOARTHRITIS OF LEFT KNEE: Primary | ICD-10-CM

## 2025-06-19 PROCEDURE — 97110 THERAPEUTIC EXERCISES: CPT | Mod: GP | Performed by: PHYSICAL THERAPIST

## 2025-06-19 PROCEDURE — 97530 THERAPEUTIC ACTIVITIES: CPT | Mod: GP | Performed by: PHYSICAL THERAPIST

## 2025-06-19 ASSESSMENT — PAIN SCALES - GENERAL: PAINLEVEL_OUTOF10: 1

## 2025-06-19 ASSESSMENT — PAIN - FUNCTIONAL ASSESSMENT: PAIN_FUNCTIONAL_ASSESSMENT: 0-10

## 2025-06-19 NOTE — PROGRESS NOTES
Physical Therapy Treatment    Patient Name: Dustin Kenny  MRN: [id number]  Today's Date: 6/19/2025    Current Problem  Problem List Items Addressed This Visit           ICD-10-CM    Unilateral primary osteoarthritis, left knee - Primary M17.12    Difficulty walking R26.2    History of total left knee replacement Z96.652       Insurance:  Payor: [employer] / Plan: [employer]ICARE / Product Type: *No Product type* /   Number of Treatments Authorized: 10/12  authorized  Certification Period Start Date: 05/06/25  Certification Period End Date: 07/01/25    Subjective   General  Reason for Referral: L TKA   DOS 4/17/25  Referred By: MD Yusef  Past Medical History Relevant to Rehab: HTN, OA, Hearing aides  General Comment: Reports 85-90% improved. Reporting occassional pain at knee. Shin pain much less as well as quad pain.    Performing HEP?: Yes    Precautions  Precautions  Precautions Comment: Standard TKA precautions.  Pain  Pain Assessment: 0-10  0-10 (Numeric) Pain Score: 1  Pain Location: Knee  Pain Orientation: Left    Objective   General Observation  General Observation: L KNEE ROM 0 - 120* (PT AMB WITHOUT CANE)  Hip AROM  R hip flexion: (125°): 115  L hip flexion: (125°): 115  R hip ER: (45°): 45  L hip ER: (45°): 40  R hip IR: (45°): 25  L hip IR: (45°): 15  Hip PROM  R hip flexion: (125°): 115  L hip flexion: (125°): 115  R hip ER: (45°): 45  L hip ER: (45°): 40  R hip IR: (45°): 25  L hip IR: (45°): 15  Specific Lower Extremity MMT   R Iliopsoas: (5/5): 5/5  L Iliopsoas: (5/5): 4/5  R Gluteals (prone): (5/5): grossly 4/5  L Gluteals (prone): (5/5): grossly 4-/5  R Gluteals (sidelying): (5/5): 4/5  L Gluteals (sidelying): (5/5): 4-/5  KNEE  Knee AROM  R knee flexion: (140°): 130  L knee flexion: (140°): 120  R knee extension: (0°): 0  L knee extension: (0°): 0  Knee MMT  R knee flexion: (5/5): 5/5  L knee flexion: (5/5): 4+/5  R knee extension: (5/5): 5/5  L knee extension: (5/5):  4/5  Treatments:    Therapeutic Exercise  Therapeutic Exercise Activity 1: SCIFIT:  level 3 - x 6 min.  Therapeutic Exercise Activity 2: GASTROC STRETCH X 1 MIN  Therapeutic Exercise Activity 3: HEEL RAISES X 1 MIN  Therapeutic Exercise Activity 4: DYNAMICS TIN [employer]DIER, MARCH, BUTT KICK 20 ft x 2 ea  Therapeutic Exercise Activity 7: HAM CURLS 50# - 2 X 1 MIN  Therapeutic Exercise Activity 8: KNEE EXT 40# -- 2 X 1 MIN    Therapeutic Activity  Therapeutic Activity 1: TM: 1.8 mph - 2.0 mph  Therapeutic Activity 2: See measures and goal status  Therapeutic Activity 3: FWD step ups: x 1 min  Therapeutic Activity 4: Side steps: x 1 min  Therapeutic Activity 5: Step downs: x 1 min    OP EDUCATION:  Outpatient Education  Education Comment: Instructed to perform knee flexion overpressure at step - 10 sec hold.    Assessment:  PT Assessment  Assessment Comment: Challenged with step downs.  Tolerating sessions well. Would like to gain confidence for return to gym exercises for independent strengthening following therapy.    Plan:  OP PT Plan  Treatment/Interventions: Education/ Instruction, Gait training, Manual therapy, Neuromuscular re-education, Self care/ home management, Taping techniques, Therapeutic activities, Therapeutic exercises  PT Plan: Skilled PT  PT Frequency: 2 times per week  Duration: 8 weeks  Onset Date: 04/17/25  Certification Period Start Date: 05/06/25  Certification Period End Date: 07/01/25  Number of Treatments Authorized: 10/12  authorized  Rehab Potential: Good  Plan of Care Agreement: Patient    Goals:  Active       PT Problem       PT Goal 1       Start:  05/06/25    Expected End:  08/04/25       STG  1) Patient will improve LEFS score by 9 points in order to perform functional activities at home and in the community in 4 weeks. PROGRESSING.   2) Patient will be able to complete ADLs with pain in knee less than 2/10 in 4 weeks. ACHIEVED.   3) Pt will improve knee LEFT ROM to 0-105 degrees to be  able to complete ADLs with less difficulty in 4 weeks. ACHIEVED.   4) Patient will be independent with HEP to allow for continued improvement in daily tasks at home and in the community in 3 visits. ACHIEVED.   5) Patient will perform 5 x sit to stand without UE support in 12 seconds in 4 weeks to demonstrate LE strength gains and ease of transfers in 4 weeks. PROGRESSING.   LTG  1) Patient will have >/=5/5 strength in hip musculature to aid in balance with ambulation on varied surfaces in community in 8 weeks. PROGRESSING.   2) Patient will be able to perform stair climbing in reciprocal manner without pain and light rail use for safe community access in 8 weeks. ACHIEVED.   3) Patient will be able to walk for up to 30 minutes with minimal gait deviation and no pain for community access in 8 weeks. ACHIEVED.   4) Patient will improve LEFS score >/=65/80 points in order to perform functional activities at home and in the community by discharge. PROGRESSING.               Time Calculation  Start Time: 0915  Stop Time: 1000  Time Calculation (min): 45 min  PT Therapeutic Procedures Time Entry  Therapeutic Exercise Time Entry: 15  Therapeutic Activity Time Entry: 24,

## 2025-06-23 ENCOUNTER — TREATMENT (OUTPATIENT)
Dept: PHYSICAL THERAPY | Facility: CLINIC | Age: 70
End: 2025-06-23
Payer: MEDICARE

## 2025-06-23 DIAGNOSIS — Z96.652 PRESENCE OF LEFT ARTIFICIAL KNEE JOINT: ICD-10-CM

## 2025-06-23 DIAGNOSIS — Z96.652 HISTORY OF TOTAL LEFT KNEE REPLACEMENT: ICD-10-CM

## 2025-06-23 DIAGNOSIS — R26.2 DIFFICULTY WALKING: ICD-10-CM

## 2025-06-23 DIAGNOSIS — M17.12 UNILATERAL PRIMARY OSTEOARTHRITIS, LEFT KNEE: ICD-10-CM

## 2025-06-23 DIAGNOSIS — M17.12 LOCALIZED OSTEOARTHRITIS OF LEFT KNEE: Primary | ICD-10-CM

## 2025-06-23 PROCEDURE — 97140 MANUAL THERAPY 1/> REGIONS: CPT | Mod: GP | Performed by: PHYSICAL THERAPIST

## 2025-06-23 PROCEDURE — 97110 THERAPEUTIC EXERCISES: CPT | Mod: GP | Performed by: PHYSICAL THERAPIST

## 2025-06-23 ASSESSMENT — PAIN - FUNCTIONAL ASSESSMENT: PAIN_FUNCTIONAL_ASSESSMENT: 0-10

## 2025-06-23 ASSESSMENT — PAIN SCALES - GENERAL: PAINLEVEL_OUTOF10: 1

## 2025-06-23 NOTE — PROGRESS NOTES
Physical Therapy Treatment    Patient Name: Dustin Kenny  MRN: [id number]  Today's Date: 6/23/2025    Current Problem  Problem List Items Addressed This Visit           ICD-10-CM    Unilateral primary osteoarthritis, left knee - Primary M17.12    Difficulty walking R26.2    History of total left knee replacement Z96.652       Insurance:  Payor: [employer] / Plan: [employer] / Product Type: *No Product type* /   Number of Treatments Authorized: 11/12  authorized  Certification Period Start Date: 05/06/25  Certification Period End Date: 07/01/25    Subjective   General  Reason for Referral: L TKA   DOS 4/17/25  Referred By: MD Yusef  Past Medical History Relevant to Rehab: HTN, OA, Hearing aides  General Comment: L shin pain more aggravated today.    Performing HEP?: Yes    Precautions  Precautions  Precautions Comment: Standard TKA precautions.  Pain  Pain Assessment: 0-10  0-10 (Numeric) Pain Score: 1  Pain Location: Knee  Pain Orientation: Left    Objective   Slight antalgic gait L - reports due to shin pain.   L knee ROM:   0-128 deg.  Treatments:    Therapeutic Exercise  Therapeutic Exercise Activity 1: SCIFIT:  level 3 - x 6 min.  Therapeutic Exercise Activity 2: GASTROC STRETCH X 1 MIN  Therapeutic Exercise Activity 3: HEEL RAISES X 1 MIN  Therapeutic Exercise Activity 4: DYNAMICS: MARCH, BUTT KICK 20 ft x 2 ea  Therapeutic Exercise Activity 6: TG L7 SQUATS X 2 MIN  Therapeutic Exercise Activity 7: HAM CURLS 55# - 2 X 1 MIN  Therapeutic Exercise Activity 8: KNEE EXT 50# -- 2 X 1 MIN     Manual Therapy  Manual Therapy Activity 1: STM of L knee and lower leg for pain and edema control.    OP EDUCATION:  Outpatient Education  Education Comment: CONTINUE WITH CURRENT HEP    Assessment:  PT Assessment  Assessment Comment: Patient reporting less shin pain following session. Instructed to use ice a shin for analgesic.  L knee ROM near equal to opposite LE.    Plan:  OP PT  Plan  Treatment/Interventions: Education/ Instruction, Gait training, Manual therapy, Neuromuscular re-education, Self care/ home management, Taping techniques, Therapeutic activities, Therapeutic exercises  PT Plan: Skilled PT  PT Frequency: 2 times per week  Duration: 8 weeks  Onset Date: 04/17/25  Certification Period Start Date: 05/06/25  Certification Period End Date: 07/01/25  Number of Treatments Authorized: 11/12  authorized  Rehab Potential: Good  Plan of Care Agreement: Patient  Final goal check.   Goals:  Active       PT Problem       PT Goal 1       Start:  05/06/25    Expected End:  08/04/25       STG  1) Patient will improve LEFS score by 9 points in order to perform functional activities at home and in the community in 4 weeks. PROGRESSING.   2) Patient will be able to complete ADLs with pain in knee less than 2/10 in 4 weeks. ACHIEVED.   3) Pt will improve knee LEFT ROM to 0-105 degrees to be able to complete ADLs with less difficulty in 4 weeks. ACHIEVED.   4) Patient will be independent with HEP to allow for continued improvement in daily tasks at home and in the community in 3 visits. ACHIEVED.   5) Patient will perform 5 x sit to stand without UE support in 12 seconds in 4 weeks to demonstrate LE strength gains and ease of transfers in 4 weeks. PROGRESSING.   LTG  1) Patient will have >/=5/5 strength in hip musculature to aid in balance with ambulation on varied surfaces in community in 8 weeks. PROGRESSING.   2) Patient will be able to perform stair climbing in reciprocal manner without pain and light rail use for safe community access in 8 weeks. ACHIEVED.   3) Patient will be able to walk for up to 30 minutes with minimal gait deviation and no pain for community access in 8 weeks. ACHIEVED.   4) Patient will improve LEFS score >/=65/80 points in order to perform functional activities at home and in the community by discharge. PROGRESSING.               Time Calculation  Start Time: 1415  Stop  Time: 1500  Time Calculation (min): 45 min  PT Therapeutic Procedures Time Entry  Therapeutic Exercise Time Entry: 30  Manual Therapy Time Entry: 10,

## 2025-06-25 ENCOUNTER — TREATMENT (OUTPATIENT)
Dept: PHYSICAL THERAPY | Facility: CLINIC | Age: 70
End: 2025-06-25
Payer: MEDICARE

## 2025-06-25 DIAGNOSIS — M17.12 UNILATERAL PRIMARY OSTEOARTHRITIS, LEFT KNEE: ICD-10-CM

## 2025-06-25 DIAGNOSIS — R26.2 DIFFICULTY WALKING: ICD-10-CM

## 2025-06-25 DIAGNOSIS — Z96.652 HISTORY OF TOTAL LEFT KNEE REPLACEMENT: ICD-10-CM

## 2025-06-25 DIAGNOSIS — M17.12 LOCALIZED OSTEOARTHRITIS OF LEFT KNEE: Primary | ICD-10-CM

## 2025-06-25 DIAGNOSIS — Z96.652 PRESENCE OF LEFT ARTIFICIAL KNEE JOINT: ICD-10-CM

## 2025-06-25 PROCEDURE — 97530 THERAPEUTIC ACTIVITIES: CPT | Mod: GP | Performed by: PHYSICAL THERAPIST

## 2025-06-25 PROCEDURE — 97110 THERAPEUTIC EXERCISES: CPT | Mod: GP | Performed by: PHYSICAL THERAPIST

## 2025-06-25 PROCEDURE — 97112 NEUROMUSCULAR REEDUCATION: CPT | Mod: GP | Performed by: PHYSICAL THERAPIST

## 2025-06-25 ASSESSMENT — PAIN - FUNCTIONAL ASSESSMENT: PAIN_FUNCTIONAL_ASSESSMENT: 0-10

## 2025-06-25 ASSESSMENT — PAIN SCALES - GENERAL: PAINLEVEL_OUTOF10: 1

## 2025-06-25 NOTE — PROGRESS NOTES
Physical Therapy Treatment    Patient Name: Dustin Kenny  MRN: [id number]  Today's Date: 6/25/2025    Current Problem  Problem List Items Addressed This Visit           ICD-10-CM    Unilateral primary osteoarthritis, left knee - Primary M17.12    Difficulty walking R26.2    History of total left knee replacement Z96.652       Insurance:  Payor: [employer] / Plan: [employer]ICARE / Product Type: *No Product type* /   Number of Treatments Authorized: 12/12  authorized  Certification Period Start Date: 05/06/25  Certification Period End Date: 07/01/25    Subjective : Using cane for long distances for balance.    General  Reason for Referral: L TKA   DOS 4/17/25  Referred By: MD Yusef  Past Medical History Relevant to Rehab: HTN, OA, Hearing aides  General Comment: L shin stil alittle sore, but better. Most concerned about balance when walking long distances.    Performing HEP?: Yes    Precautions  Precautions  Precautions Comment: Standard TKA precautions.  Pain  Pain Assessment: 0-10  0-10 (Numeric) Pain Score: 1    Objective     Hip AROM  R hip flexion: (125°): 115  L hip flexion: (125°): 115  R hip ER: (45°): 45  L hip ER: (45°): 40  R hip IR: (45°): 25  L hip IR: (45°): 15  Hip PROM  R hip flexion: (125°): 115  L hip flexion: (125°): 115  R hip ER: (45°): 45  L hip ER: (45°): 40  R hip IR: (45°): 25  L hip IR: (45°): 15  Specific Lower Extremity MMT   R Iliopsoas: (5/5): 5/5  L Iliopsoas: (5/5): 4/5  R Gluteals (prone): (5/5): 4+/5  L Gluteals (prone): (5/5): 4/5  R Gluteals (sidelying): (5/5): 4/5  L Gluteals (sidelying): (5/5): 4/5  Knee AROM  R knee flexion: (140°): 130  L knee flexion: (140°): 120  R knee extension: (0°): 0  L knee extension: (0°): 0  Knee MMT  R knee flexion: (5/5): 5/5  L knee flexion: (5/5): 4+/5  R knee extension: (5/5): 5/5  L knee extension: (5/5): 4-+5    Treatments:    Therapeutic Exercise  Therapeutic Exercise Activity 1: SCIFIT:  level 3 - x 6  min.  Therapeutic Exercise Activity 2: GASTROC STRETCH X 1 MIN  Therapeutic Exercise Activity 3: HEEL RAISES X 1 MIN  Therapeutic Exercise Activity 4: DYNAMICS: MARCH, BUTT KICK 20 ft x 2 ea    Balance/Neuromuscular Re-Education  Balance/Neuromuscular Re-Education Activity 1: AirEx Beam side steps:1 min  Balance/Neuromuscular Re-Education Activity 2: AirEx Beam forward walk x 2 min  Balance/Neuromuscular Re-Education Activity 3: AirEx Beam : FWD/BWD stepping motion: 2 min     Therapeutic Activity  Therapeutic Activity 1: FWD step: x 10 -B  Therapeutic Activity 2: Side step: x 10 - B  Therapeutic Activity 3: BWD step: x 10  Therapeutic Activity 4: Rock and reach: x 10 - B  Therapeutic Activity 5: Measures and goal status    OP EDUCATION:  Outpatient Education  Education Comment: CONTINUE WITH CURRENT HEP    Assessment:  PT Assessment  Assessment Comment: Continues to be challenged with balance activities.  Will benefit form add'l visits.  Recommend 1 x a week for 6 add'l visits.    Plan:  OP PT Plan  Treatment/Interventions: Education/ Instruction, Gait training, Manual therapy, Neuromuscular re-education, Self care/ home management, Taping techniques, Therapeutic activities, Therapeutic exercises  PT Plan: Skilled PT  PT Frequency: 2 times per week  Duration: 8 weeks  Onset Date: 04/17/25  Certification Period Start Date: 05/06/25  Certification Period End Date: 07/01/25  Number of Treatments Authorized: 12/12  authorized  Rehab Potential: Good  Plan of Care Agreement: Patient    Goals:  Active       PT Problem       PT Goal 1       Start:  05/06/25    Expected End:  08/04/25       STG  1) Patient will improve LEFS score by 9 points in order to perform functional activities at home and in the community in 4 weeks. PROGRESSING.   2) Patient will be able to complete ADLs with pain in knee less than 2/10 in 4 weeks. ACHIEVED.   3) Pt will improve knee LEFT ROM to 0-105 degrees to be able to complete ADLs with less  difficulty in 4 weeks. ACHIEVED.   4) Patient will be independent with HEP to allow for continued improvement in daily tasks at home and in the community in 3 visits. ACHIEVED.   5) Patient will perform 5 x sit to stand without UE support in 12 seconds in 4 weeks to demonstrate LE strength gains and ease of transfers in 4 weeks. PROGRESSING.   LTG  1) Patient will have >/=5/5 strength in hip musculature to aid in balance with ambulation on varied surfaces in community in 8 weeks. PROGRESSING.   2) Patient will be able to perform stair climbing in reciprocal manner without pain and light rail use for safe community access in 8 weeks. ACHIEVED.   3) Patient will be able to walk for up to 30 minutes with minimal gait deviation and no pain for community access in 8 weeks. ACHIEVED.   4) Patient will improve LEFS score >/=65/80 points in order to perform functional activities at home and in the community by discharge. PROGRESSING.               Time Calculation  Start Time: 0830  Stop Time: 0015  Time Calculation (min): 945 min  PT Therapeutic Procedures Time Entry  Therapeutic Exercise Time Entry: 18  Neuromuscular Re-Education Time Entry: 6  Therapeutic Activity Time Entry: 15,

## 2025-07-03 ENCOUNTER — TREATMENT (OUTPATIENT)
Dept: PHYSICAL THERAPY | Facility: CLINIC | Age: 70
End: 2025-07-03
Payer: MEDICARE

## 2025-07-03 DIAGNOSIS — M17.12 UNILATERAL PRIMARY OSTEOARTHRITIS, LEFT KNEE: ICD-10-CM

## 2025-07-03 DIAGNOSIS — F41.9 ANXIETY: ICD-10-CM

## 2025-07-03 DIAGNOSIS — M17.12 LOCALIZED OSTEOARTHRITIS OF LEFT KNEE: Primary | ICD-10-CM

## 2025-07-03 DIAGNOSIS — Z96.652 HISTORY OF TOTAL LEFT KNEE REPLACEMENT: ICD-10-CM

## 2025-07-03 DIAGNOSIS — R26.2 DIFFICULTY WALKING: ICD-10-CM

## 2025-07-03 DIAGNOSIS — Z96.652 PRESENCE OF LEFT ARTIFICIAL KNEE JOINT: ICD-10-CM

## 2025-07-03 PROCEDURE — 97112 NEUROMUSCULAR REEDUCATION: CPT | Mod: GP,CQ

## 2025-07-03 PROCEDURE — 97110 THERAPEUTIC EXERCISES: CPT | Mod: GP,CQ

## 2025-07-03 RX ORDER — HYDROXYZINE HYDROCHLORIDE 25 MG/1
25 TABLET, FILM COATED ORAL 2 TIMES DAILY
Qty: 60 TABLET | Refills: 0 | Status: SHIPPED | OUTPATIENT
Start: 2025-07-03

## 2025-07-03 ASSESSMENT — PAIN SCALES - GENERAL: PAINLEVEL_OUTOF10: 0 - NO PAIN

## 2025-07-03 ASSESSMENT — PAIN - FUNCTIONAL ASSESSMENT: PAIN_FUNCTIONAL_ASSESSMENT: 0-10

## 2025-07-03 NOTE — PROGRESS NOTES
Physical Therapy Treatment    Patient Name: Dustin Kenny  MRN: [id number]  Today's Date: 7/3/2025    Current Problem  Problem List Items Addressed This Visit           ICD-10-CM    Unilateral primary osteoarthritis, left knee - Primary M17.12    Difficulty walking R26.2    History of total left knee replacement Z96.652       Insurance:  Payor: [employer] / Plan: [employer]ICARE / Product Type: *No Product type* /   Number of Treatments Authorized: 13/18  authorized  Certification Period Start Date: 07/02/25  Certification Period End Date: 08/13/25    Subjective   General  Reason for Referral: L TKA   DOS 4/17/25  Referred By: MD Yusef  Past Medical History Relevant to Rehab: HTN, OA, Hearing aides  General Comment: PT STATES HIS L KNEE HAS BEEN FEELING GOOD FOR THE PAST WEEK AND A HALF. PT WOULD LIKE TO CONCENTRATE MORE ON HIS BALANCE TODAY.    Performing HEP?: Yes    Precautions  Precautions  Precautions Comment: Standard TKA precautions.  Pain  Pain Assessment: 0-10  0-10 (Numeric) Pain Score: 0 - No pain  Pain Location: Knee  Pain Orientation: Left    Objective   General Observation  General Observation: FAIR BALANCE    Treatments:    Therapeutic Exercise  Therapeutic Exercise Activity 1: SCIFIT:  level 3 - x 6 min.  Therapeutic Exercise Activity 2: GASTROC STRETCH X 1 MIN  Therapeutic Exercise Activity 3: INCLINE HEEL RAISES X 1 MIN  Therapeutic Exercise Activity 4: DYNAMICS: MARCH, BUTT KICK, TIN SOLDIER, SIDE LUNGE  Therapeutic Exercise Activity 5: TG L7 SQUATS YELLOW BAND X 1 MIN  Therapeutic Exercise Activity 6: FOOTWORM YELLOW LOOP 2 X 40'    Balance/Neuromuscular Re-Education  Balance/Neuromuscular Re-Education Activity 1: BIODEX RANDOM EASY SLOW L12 WITH/WITHOUT UE X 3 MIN 83%  Balance/Neuromuscular Re-Education Activity 2: BIODEX WT SHIFT S/S WITH / WITHOUT UE 64%  Balance/Neuromuscular Re-Education Activity 3: AIREX SIDE STEP // BAR X 3 MIN  Balance/Neuromuscular  Re-Education Activity 4: TILT BOARD F/B TAPS X 2 MIN // BAR  Balance/Neuromuscular Re-Education Activity 5: FACING WALL: SIDE STEPS TO GIVE FEEDBACK ON PROPER SIDE STEP PLACEMENT                             OP EDUCATION:  Outpatient Education  Education Comment: CONTINUE WITH CURRENT HEP    Assessment:  PT Assessment  Assessment Comment: PT KIRAN EX'S WELL.  HE WAS CHALLENGED WITH BALANCE ACTIVITIES AND FREQUENTLY USED UE FOR ASSISTANCE.  PT IS SLOWLY PROGRESSING WITH HIS STRENGTH.    Plan:  OP PT Plan  Treatment/Interventions: Education/ Instruction, Gait training, Manual therapy, Neuromuscular re-education, Self care/ home management, Taping techniques, Therapeutic activities, Therapeutic exercises  PT Plan: Skilled PT  PT Frequency: 2 times per week  Duration: 8 weeks  Onset Date: 04/17/25  Certification Period Start Date: 07/02/25  Certification Period End Date: 08/13/25  Number of Treatments Authorized: 13/18  authorized  Rehab Potential: Good  Plan of Care Agreement: Patient    Goals:  Active       PT Problem       PT Goal 1       Start:  05/06/25    Expected End:  08/04/25       STG  1) Patient will improve LEFS score by 9 points in order to perform functional activities at home and in the community in 4 weeks. PROGRESSING.   2) Patient will be able to complete ADLs with pain in knee less than 2/10 in 4 weeks. ACHIEVED.   3) Pt will improve knee LEFT ROM to 0-105 degrees to be able to complete ADLs with less difficulty in 4 weeks. ACHIEVED.   4) Patient will be independent with HEP to allow for continued improvement in daily tasks at home and in the community in 3 visits. ACHIEVED.   5) Patient will perform 5 x sit to stand without UE support in 12 seconds in 4 weeks to demonstrate LE strength gains and ease of transfers in 4 weeks. PROGRESSING.   LTG  1) Patient will have >/=5/5 strength in hip musculature to aid in balance with ambulation on varied surfaces in community in 8 weeks. PROGRESSING.   2) Patient  will be able to perform stair climbing in reciprocal manner without pain and light rail use for safe community access in 8 weeks. ACHIEVED.   3) Patient will be able to walk for up to 30 minutes with minimal gait deviation and no pain for community access in 8 weeks. ACHIEVED.   4) Patient will improve LEFS score >/=65/80 points in order to perform functional activities at home and in the community by discharge. PROGRESSING.               Time Calculation  Start Time: 1031  Stop Time: 1114  Time Calculation (min): 43 min  PT Therapeutic Procedures Time Entry  Therapeutic Exercise Time Entry: 20  Neuromuscular Re-Education Time Entry: 23,

## 2025-07-10 ENCOUNTER — TREATMENT (OUTPATIENT)
Dept: PHYSICAL THERAPY | Facility: CLINIC | Age: 70
End: 2025-07-10
Payer: MEDICARE

## 2025-07-10 DIAGNOSIS — Z96.652 HISTORY OF TOTAL LEFT KNEE REPLACEMENT: ICD-10-CM

## 2025-07-10 DIAGNOSIS — M17.12 LOCALIZED OSTEOARTHRITIS OF LEFT KNEE: Primary | ICD-10-CM

## 2025-07-10 DIAGNOSIS — R26.2 DIFFICULTY WALKING: ICD-10-CM

## 2025-07-10 DIAGNOSIS — M17.12 UNILATERAL PRIMARY OSTEOARTHRITIS, LEFT KNEE: ICD-10-CM

## 2025-07-10 DIAGNOSIS — F32.A DEPRESSION, UNSPECIFIED DEPRESSION TYPE: ICD-10-CM

## 2025-07-10 DIAGNOSIS — Z96.652 PRESENCE OF LEFT ARTIFICIAL KNEE JOINT: ICD-10-CM

## 2025-07-10 PROCEDURE — 97112 NEUROMUSCULAR REEDUCATION: CPT | Mod: GP | Performed by: PHYSICAL THERAPIST

## 2025-07-10 PROCEDURE — 97110 THERAPEUTIC EXERCISES: CPT | Mod: GP | Performed by: PHYSICAL THERAPIST

## 2025-07-10 RX ORDER — CITALOPRAM 40 MG/1
40 TABLET ORAL DAILY
Qty: 90 TABLET | Refills: 3 | Status: SHIPPED | OUTPATIENT
Start: 2025-07-10

## 2025-07-10 ASSESSMENT — PAIN SCALES - GENERAL: PAINLEVEL_OUTOF10: 0 - NO PAIN

## 2025-07-10 ASSESSMENT — PAIN - FUNCTIONAL ASSESSMENT: PAIN_FUNCTIONAL_ASSESSMENT: 0-10

## 2025-07-10 NOTE — PROGRESS NOTES
Physical Therapy Treatment    Patient Name: Dustin Kenny  MRN: [id number]  Today's Date: 7/10/2025    Current Problem  Problem List Items Addressed This Visit           ICD-10-CM    Unilateral primary osteoarthritis, left knee - Primary M17.12    Difficulty walking R26.2    History of total left knee replacement Z96.652       Insurance:  Payor: [employer] / Plan: [employer]ICARE / Product Type: *No Product type* /   Number of Treatments Authorized: 14/18  authorized  Certification Period Start Date: 07/02/25  Certification Period End Date: 08/13/25    Subjective   General  Reason for Referral: L TKA   DOS 4/17/25  Referred By: MD Yusef  Past Medical History Relevant to Rehab: HTN, OA, Hearing aides  General Comment: Feeling more steady.  Feeling ongoing confidence in knee and balance.    Performing HEP?: Yes    Precautions  Precautions  Precautions Comment: Standard TKA precautions.  Pain  Pain Assessment: 0-10  0-10 (Numeric) Pain Score: 0 - No pain  Pain Location: Knee  Pain Orientation: Left    Objective     General Observation  General Observation: Keeping head steady during gait to improve walking stability.    Treatments:    Therapeutic Exercise  Therapeutic Exercise Activity 1: SCIFIT:  level 3 - x 6 min.  Therapeutic Exercise Activity 2: GASTROC STRETCH X 1 MIN  Therapeutic Exercise Activity 3: INCLINE HEEL RAISES X 1 MIN  Therapeutic Exercise Activity 4: DYNAMICS: MARCH, BUTT KICK, TIN SOLDIER, SIDE LUNGE  Therapeutic Exercise Activity 5: TG L7 SQUATS YELLOW BAND X 1 MIN    Balance/Neuromuscular Re-Education  Balance/Neuromuscular Re-Education Activity 1: Biodex Random easy slow - w/wo UE support level 12 - 75%  Balance/Neuromuscular Re-Education Activity 2: Biodex Wt shift -w/wo UE support - L12 - 3 min - 67%  Balance/Neuromuscular Re-Education Activity 3: Rock and reach: 2 x 10 - B  Balance/Neuromuscular Re-Education Activity 4: Forward step: 2 x 10 ALT -  B  Balance/Neuromuscular Re-Education Activity 5: Side Stepping: 2 x 10  Balance/Neuromuscular Re-Education Activity 6: Backward strpping: 2 x 10 ALT    OP EDUCATION:  Outpatient Education  Education Comment: CONTINUE WITH CURRENT HEP    Assessment:  PT Assessment  Assessment Comment: Tolerated session well. Challenged with balance and motor control during stepping exercises. Bwk step most difficult. BioDex Random score declined today - Wt shift improved. Will benefit from continued therapy towards goals.    Plan:  OP PT Plan  Treatment/Interventions: Education/ Instruction, Gait training, Manual therapy, Neuromuscular re-education, Self care/ home management, Taping techniques, Therapeutic activities, Therapeutic exercises  PT Plan: Skilled PT  PT Frequency: 2 times per week  Duration: 8 weeks  Onset Date: 04/17/25  Certification Period Start Date: 07/02/25  Certification Period End Date: 08/13/25  Number of Treatments Authorized: 14/18  authorized  Rehab Potential: Good  Plan of Care Agreement: Patient  Continue with strength, ROM and balance.   Goals:  Active       PT Problem       PT Goal 1       Start:  05/06/25    Expected End:  08/04/25       STG  1) Patient will improve LEFS score by 9 points in order to perform functional activities at home and in the community in 4 weeks. PROGRESSING.   2) Patient will be able to complete ADLs with pain in knee less than 2/10 in 4 weeks. ACHIEVED.   3) Pt will improve knee LEFT ROM to 0-105 degrees to be able to complete ADLs with less difficulty in 4 weeks. ACHIEVED.   4) Patient will be independent with HEP to allow for continued improvement in daily tasks at home and in the community in 3 visits. ACHIEVED.   5) Patient will perform 5 x sit to stand without UE support in 12 seconds in 4 weeks to demonstrate LE strength gains and ease of transfers in 4 weeks. PROGRESSING.   LTG  1) Patient will have >/=5/5 strength in hip musculature to aid in balance with ambulation on  varied surfaces in community in 8 weeks. PROGRESSING.   2) Patient will be able to perform stair climbing in reciprocal manner without pain and light rail use for safe community access in 8 weeks. ACHIEVED.   3) Patient will be able to walk for up to 30 minutes with minimal gait deviation and no pain for community access in 8 weeks. ACHIEVED.   4) Patient will improve LEFS score >/=65/80 points in order to perform functional activities at home and in the community by discharge. PROGRESSING.               Time Calculation  Start Time: 1130  Stop Time: 1215  Time Calculation (min): 45 min  PT Therapeutic Procedures Time Entry  Therapeutic Exercise Time Entry: 18  Neuromuscular Re-Education Time Entry: 23,

## 2025-07-17 ENCOUNTER — TREATMENT (OUTPATIENT)
Dept: PHYSICAL THERAPY | Facility: CLINIC | Age: 70
End: 2025-07-17
Payer: MEDICARE

## 2025-07-17 DIAGNOSIS — Z96.652 PRESENCE OF LEFT ARTIFICIAL KNEE JOINT: ICD-10-CM

## 2025-07-17 DIAGNOSIS — R26.2 DIFFICULTY WALKING: ICD-10-CM

## 2025-07-17 DIAGNOSIS — M17.12 UNILATERAL PRIMARY OSTEOARTHRITIS, LEFT KNEE: ICD-10-CM

## 2025-07-17 DIAGNOSIS — M17.12 LOCALIZED OSTEOARTHRITIS OF LEFT KNEE: Primary | ICD-10-CM

## 2025-07-17 DIAGNOSIS — Z96.652 HISTORY OF TOTAL LEFT KNEE REPLACEMENT: ICD-10-CM

## 2025-07-17 PROCEDURE — 97112 NEUROMUSCULAR REEDUCATION: CPT | Mod: GP | Performed by: PHYSICAL THERAPIST

## 2025-07-17 PROCEDURE — 97110 THERAPEUTIC EXERCISES: CPT | Mod: GP | Performed by: PHYSICAL THERAPIST

## 2025-07-17 ASSESSMENT — PAIN SCALES - GENERAL: PAINLEVEL_OUTOF10: 0 - NO PAIN

## 2025-07-17 ASSESSMENT — PAIN - FUNCTIONAL ASSESSMENT: PAIN_FUNCTIONAL_ASSESSMENT: 0-10

## 2025-07-17 NOTE — PROGRESS NOTES
Physical Therapy Treatment    Patient Name: Dustin Kenny  MRN: [id number]  Today's Date: 7/17/2025    Current Problem  Problem List Items Addressed This Visit           ICD-10-CM    Unilateral primary osteoarthritis, left knee - Primary M17.12    Difficulty walking R26.2    History of total left knee replacement Z96.652       Insurance:  Payor: [employer]ICARE / Plan: [employer]ICARE / Product Type: *No Product type* /   Number of Treatments Authorized: 15/18  authorized  Certification Period Start Date: 07/02/25  Certification Period End Date: 08/13/25    Subjective   General  Reason for Referral: L TKA   DOS 4/17/25  Referred By: MD Yusef  Past Medical History Relevant to Rehab: HTN, OA, Hearing aides  General Comment: Continued confidence.  No knee pain. Some shin splints with walking.    Performing HEP?: Yes    Precautions  Precautions  Precautions Comment: Standard TKA precautions.  Pain  Pain Assessment: 0-10  0-10 (Numeric) Pain Score: 0 - No pain    Objective     General Observation  General Observation: Keeping head steady during gait to improve walking stability.    Treatments:    Therapeutic Exercise  Therapeutic Exercise Activity 1: SCIFIT:  level 3 - x 6 min.  Therapeutic Exercise Activity 2: GASTROC STRETCH X 1 MIN  Therapeutic Exercise Activity 4: DYNAMICS: MARCH, BUTT KICK, TIN SOLDIER, SIDE LUNGE  Therapeutic Exercise Activity 5: TG L7 SQUATS YELLOW BAND X 1 MIN  Therapeutic Exercise Activity 7: HAM CURLS 55# - 2 X 1 MIN  Therapeutic Exercise Activity 8: KNEE EXT 50# -- 2 X 1 MIN    Balance/Neuromuscular Re-Education  Balance/Neuromuscular Re-Education Activity 1: BioDex Random- easy /slow - w/o UE support  L12 : 89 %  Balance/Neuromuscular Re-Education Activity 2: Biodex Wt shift - w/wo UE support - L 12 x 3 min: 100%  Balance/Neuromuscular Re-Education Activity 3: Rock and Reach:  x 10  Balance/Neuromuscular Re-Education Activity 4: Forward step: ALT x  10  Balance/Neuromuscular Re-Education Activity 5: Side stepping: ALT x 10  Balance/Neuromuscular Re-Education Activity 6: BWD step: x 10 - ALT    OP EDUCATION:  Outpatient Education  Education Comment: CONTINUE WITH CURRENT HEP    Assessment:  PT Assessment  Assessment Comment: Tolerated session well. Demonstating lateral balance based on Biodex score - improving anterior/posterior balance.  Improving stepping strategies w/stepping exercises.    Plan:  OP PT Plan  Treatment/Interventions: Education/ Instruction, Gait training, Manual therapy, Neuromuscular re-education, Self care/ home management, Taping techniques, Therapeutic activities, Therapeutic exercises  PT Plan: Skilled PT  PT Frequency: 2 times per week  Duration: 8 weeks  Onset Date: 04/17/25  Certification Period Start Date: 07/02/25  Certification Period End Date: 08/13/25  Number of Treatments Authorized: 15/18  authorized  Rehab Potential: Good  Plan of Care Agreement: Patient  Progress BioDex balance work. Progress strengthening as able.   Goals:  Active       PT Problem       PT Goal 1       Start:  05/06/25    Expected End:  08/04/25       STG  1) Patient will improve LEFS score by 9 points in order to perform functional activities at home and in the community in 4 weeks. PROGRESSING.   2) Patient will be able to complete ADLs with pain in knee less than 2/10 in 4 weeks. ACHIEVED.   3) Pt will improve knee LEFT ROM to 0-105 degrees to be able to complete ADLs with less difficulty in 4 weeks. ACHIEVED.   4) Patient will be independent with HEP to allow for continued improvement in daily tasks at home and in the community in 3 visits. ACHIEVED.   5) Patient will perform 5 x sit to stand without UE support in 12 seconds in 4 weeks to demonstrate LE strength gains and ease of transfers in 4 weeks. PROGRESSING.   LTG  1) Patient will have >/=5/5 strength in hip musculature to aid in balance with ambulation on varied surfaces in community in 8 weeks.  PROGRESSING.   2) Patient will be able to perform stair climbing in reciprocal manner without pain and light rail use for safe community access in 8 weeks. ACHIEVED.   3) Patient will be able to walk for up to 30 minutes with minimal gait deviation and no pain for community access in 8 weeks. ACHIEVED.   4) Patient will improve LEFS score >/=65/80 points in order to perform functional activities at home and in the community by discharge. PROGRESSING.               Time Calculation  Start Time: 0915  Stop Time: 1000  Time Calculation (min): 45 min  PT Therapeutic Procedures Time Entry  Therapeutic Exercise Time Entry: 19  Neuromuscular Re-Education Time Entry: 23,

## 2025-07-24 ENCOUNTER — TREATMENT (OUTPATIENT)
Dept: PHYSICAL THERAPY | Facility: CLINIC | Age: 70
End: 2025-07-24
Payer: MEDICARE

## 2025-07-24 DIAGNOSIS — R26.2 DIFFICULTY WALKING: ICD-10-CM

## 2025-07-24 DIAGNOSIS — Z96.652 HISTORY OF TOTAL LEFT KNEE REPLACEMENT: ICD-10-CM

## 2025-07-24 DIAGNOSIS — Z96.652 PRESENCE OF LEFT ARTIFICIAL KNEE JOINT: ICD-10-CM

## 2025-07-24 DIAGNOSIS — M17.12 UNILATERAL PRIMARY OSTEOARTHRITIS, LEFT KNEE: ICD-10-CM

## 2025-07-24 DIAGNOSIS — M17.12 LOCALIZED OSTEOARTHRITIS OF LEFT KNEE: Primary | ICD-10-CM

## 2025-07-24 PROCEDURE — 97112 NEUROMUSCULAR REEDUCATION: CPT | Mod: GP | Performed by: PHYSICAL THERAPIST

## 2025-07-24 PROCEDURE — 97110 THERAPEUTIC EXERCISES: CPT | Mod: GP | Performed by: PHYSICAL THERAPIST

## 2025-07-24 ASSESSMENT — PAIN SCALES - GENERAL: PAINLEVEL_OUTOF10: 0 - NO PAIN

## 2025-07-24 ASSESSMENT — PAIN - FUNCTIONAL ASSESSMENT: PAIN_FUNCTIONAL_ASSESSMENT: 0-10

## 2025-07-24 NOTE — PROGRESS NOTES
Physical Therapy Treatment    Patient Name: Dustin Kenny  MRN: [id number]  Today's Date: 7/24/2025    Current Problem  Problem List Items Addressed This Visit           ICD-10-CM    Unilateral primary osteoarthritis, left knee - Primary M17.12    Difficulty walking R26.2    History of total left knee replacement Z96.652       Insurance:  Payor: [employer] / Plan: [employer] / Product Type: *No Product type* /   Number of Treatments Authorized: 16/18  authorized  Certification Period Start Date: 07/02/25  Certification Period End Date: 08/13/25    Subjective   General  Reason for Referral: L TKA   DOS 4/17/25  Referred By: MD Yusef  Past Medical History Relevant to Rehab: HTN, OA, Hearing aides  General Comment: Continued confidence in balance. Feels more steady.    Performing HEP?: Yes    Precautions  Precautions  Precautions Comment: Standard TKA precautions.  Pain  Pain Assessment: 0-10  0-10 (Numeric) Pain Score: 0 - No pain    Objective   Slow sit to  waiting area - no pain complaints.   Treatments:    Therapeutic Exercise  Therapeutic Exercise Activity 1: SCIFIT:  level 3 - x 6 min.  Therapeutic Exercise Activity 2: GASTROC STRETCH X 1 MIN  Therapeutic Exercise Activity 3: INCLINE HEEL RAISES X 1 MIN  Therapeutic Exercise Activity 4: DYNAMICS: MARCH, BUTT KICK, Backwards walk 20 ft x 2 - ea  Therapeutic Exercise Activity 5: TG L7 SQUATS YELLOW BAND 2 X 1 MIN  Therapeutic Exercise Activity 6: Monster walk, zig- zag  w/ yellow BOSU strap - 20 ft x 4  Therapeutic Exercise Activity 7: HAM CURLS 55# - 2 X 1 MIN  Therapeutic Exercise Activity 8: KNEE EXT 50# -- 2 X 1 MIN    Balance/Neuromuscular Re-Education  Balance/Neuromuscular Re-Education Activity 1: AirEx Pad: head turns/nods x 10 ea  Balance/Neuromuscular Re-Education Activity 2: AirEx Beam: side steps x 1 min  Balance/Neuromuscular Re-Education Activity 3: Standing split stance with AirEx pad: head turns x  10    Manual Therapy  Manual Therapy Activity 1: PROM of L knee -  flex/ext    OP EDUCATION:  Outpatient Education  Education Comment: CONTINUE WITH CURRENT HEP    Assessment:  PT Assessment  Assessment Comment: Demonstrates improved balance during dynamics and using AirEx pads with head movements. Making good progress towards knee and balance goals.    Plan:  OP PT Plan  Treatment/Interventions: Education/ Instruction, Gait training, Manual therapy, Neuromuscular re-education, Self care/ home management, Taping techniques, Therapeutic activities, Therapeutic exercises  PT Plan: Skilled PT  PT Frequency: 2 times per week  Duration: 8 weeks  Onset Date: 04/17/25  Certification Period Start Date: 07/02/25  Certification Period End Date: 08/13/25  Number of Treatments Authorized: 16/18  authorized  Rehab Potential: Good  Plan of Care Agreement: Patient  Start to check goals - issue LEFS next.   Goals:  Active       PT Problem       PT Goal 1       Start:  05/06/25    Expected End:  08/04/25       STG  1) Patient will improve LEFS score by 9 points in order to perform functional activities at home and in the community in 4 weeks. PROGRESSING.   2) Patient will be able to complete ADLs with pain in knee less than 2/10 in 4 weeks. ACHIEVED.   3) Pt will improve knee LEFT ROM to 0-105 degrees to be able to complete ADLs with less difficulty in 4 weeks. ACHIEVED.   4) Patient will be independent with HEP to allow for continued improvement in daily tasks at home and in the community in 3 visits. ACHIEVED.   5) Patient will perform 5 x sit to stand without UE support in 12 seconds in 4 weeks to demonstrate LE strength gains and ease of transfers in 4 weeks. PROGRESSING.   LTG  1) Patient will have >/=5/5 strength in hip musculature to aid in balance with ambulation on varied surfaces in community in 8 weeks. PROGRESSING.   2) Patient will be able to perform stair climbing in reciprocal manner without pain and light rail use  for safe community access in 8 weeks. ACHIEVED.   3) Patient will be able to walk for up to 30 minutes with minimal gait deviation and no pain for community access in 8 weeks. ACHIEVED.   4) Patient will improve LEFS score >/=65/80 points in order to perform functional activities at home and in the community by discharge. PROGRESSING.               Time Calculation  Start Time: 1045  Stop Time: 1130  Time Calculation (min): 45 min  PT Therapeutic Procedures Time Entry  Therapeutic Exercise Time Entry: 32  Neuromuscular Re-Education Time Entry: 4  Manual Therapy Time Entry: 4,

## 2025-07-31 ENCOUNTER — TREATMENT (OUTPATIENT)
Dept: PHYSICAL THERAPY | Facility: CLINIC | Age: 70
End: 2025-07-31
Payer: MEDICARE

## 2025-07-31 DIAGNOSIS — M17.12 UNILATERAL PRIMARY OSTEOARTHRITIS, LEFT KNEE: ICD-10-CM

## 2025-07-31 DIAGNOSIS — Z96.652 PRESENCE OF LEFT ARTIFICIAL KNEE JOINT: ICD-10-CM

## 2025-07-31 DIAGNOSIS — R26.2 DIFFICULTY WALKING: ICD-10-CM

## 2025-07-31 DIAGNOSIS — Z96.652 HISTORY OF TOTAL LEFT KNEE REPLACEMENT: ICD-10-CM

## 2025-07-31 DIAGNOSIS — M17.12 LOCALIZED OSTEOARTHRITIS OF LEFT KNEE: Primary | ICD-10-CM

## 2025-07-31 PROCEDURE — 97110 THERAPEUTIC EXERCISES: CPT | Mod: GP | Performed by: PHYSICAL THERAPIST

## 2025-07-31 PROCEDURE — 97530 THERAPEUTIC ACTIVITIES: CPT | Mod: GP | Performed by: PHYSICAL THERAPIST

## 2025-07-31 ASSESSMENT — PAIN SCALES - GENERAL: PAINLEVEL_OUTOF10: 0 - NO PAIN

## 2025-07-31 ASSESSMENT — PAIN - FUNCTIONAL ASSESSMENT: PAIN_FUNCTIONAL_ASSESSMENT: 0-10

## 2025-07-31 NOTE — PROGRESS NOTES
"Physical Therapy Treatment    Patient Name: Dustin Kenny  MRN: [id number]  Today's Date: 7/31/2025    Current Problem  Problem List Items Addressed This Visit           ICD-10-CM    Unilateral primary osteoarthritis, left knee - Primary M17.12    Difficulty walking R26.2    History of total left knee replacement Z96.652       Insurance:  Payor: [employer] / Plan: [employer] / Product Type: *No Product type* /   Number of Treatments Authorized: 17          Subjective   General  Reason for Referral: L TKA   DOS 4/17/25  Referred By: MD Yusef  Past Medical History Relevant to Rehab: HTN, OA, Hearing aides  General Comment: Reports he is doing well, feels ready to continue independently. States knee rarely hurts - only if walking on cement for long periods.  No longer using SPC.  States balance is improving - \"but I've always had difficulty.\"    Performing HEP?: Yes    Precautions  Precautions  Precautions Comment: Standard TKA precautions.  Pain  Pain Assessment: 0-10  0-10 (Numeric) Pain Score: 0 - No pain    Objective   Other Measures  5x Sit to Stand: 14 sec (arms crossed)  Lower Extremity Funtional Score (LEFS): 59 (73%)      Hip AROM  R hip flexion: (125°): 115  L hip flexion: (125°): 115  R hip ER: (45°): 45  L hip ER: (45°): 40  R hip IR: (45°): 25  L hip IR: (45°): 15  Hip PROM  R hip flexion: (125°): 115  L hip flexion: (125°): 115  R hip ER: (45°): 45  L hip ER: (45°): 40  R hip IR: (45°): 25  L hip IR: (45°): 15  Specific Lower Extremity MMT   R Iliopsoas: (5/5): 5/5  L Iliopsoas: (5/5): 5/5  R Gluteals (prone): (5/5): 5/5  L Gluteals (prone): (5/5): 5/5  R Gluteals (sidelying): (5/5): 5/5  L Gluteals (sidelying): (5/5): 5/5    KNEE  Knee AROM  R knee flexion: (140°): 130  L knee flexion: (140°): 128  R knee extension: (0°): 0  L knee extension: (0°): 0    Knee MMT  R knee flexion: (5/5): 5/5  L knee flexion: (5/5): 4+/5  R knee extension: (5/5): 5/5  L knee extension: " (5/5): 4-+5  Treatments:    Therapeutic Exercise  Therapeutic Exercise Activity 1: SCIFIT:  level 3 - x 8 min.  Therapeutic Exercise Activity 4: DYNAMICS: MARCH, BUTT KICK, Backwards walk 20 ft x 2 - ea  Therapeutic Exercise Activity 7: HAM CURLS 55# - 2 X 1 MIN  Therapeutic Exercise Activity 8: KNEE EXT 50# -- 2 X 40 sec    Therapeutic Activity  Therapeutic Activity 1: See measures and goal status    OP EDUCATION:  Outpatient Education  Education Comment: Edu re: goal status    Assessment:  PT Assessment  Assessment Comment: Patient made excellent progress towards goals and is able to continue  on his own at this time. Plans to continue with issued HEP.    Plan:  OP PT Plan  PT Plan:  (Discharge to HEP)  Number of Treatments Authorized: 17    Goals:  Active       PT Problem       PT Goal 1       Start:  05/06/25    Expected End:  08/04/25       STG  1) Patient will improve LEFS score by 9 points in order to perform functional activities at home and in the community in 4 weeks. ACHIEVED.   2) Patient will be able to complete ADLs with pain in knee less than 2/10 in 4 weeks. ACHIEVED.   3) Pt will improve knee LEFT ROM to 0-105 degrees to be able to complete ADLs with less difficulty in 4 weeks. ACHIEVED.   4) Patient will be independent with HEP to allow for continued improvement in daily tasks at home and in the community in 3 visits. ACHIEVED.   5) Patient will perform 5 x sit to stand without UE support in 12 seconds in 4 weeks to demonstrate LE strength gains and ease of transfers in 4 weeks. NEARLY ACHIEVED.   LTG  1) Patient will have >/=5/5 strength in hip musculature to aid in balance with ambulation on varied surfaces in community in 8 weeks. ACHIEVED.  2) Patient will be able to perform stair climbing in reciprocal manner without pain and light rail use for safe community access in 8 weeks. ACHIEVED.   3) Patient will be able to walk for up to 30 minutes with minimal gait deviation and no pain for  community access in 8 weeks. ACHIEVED.   4) Patient will improve LEFS score >/=65/80 points in order to perform functional activities at home and in the community by discharge. NEARLY ACHIEVED - 59/80              Time Calculation  Start Time: 1045  Stop Time: 1130  Time Calculation (min): 45 min  PT Therapeutic Procedures Time Entry  Therapeutic Exercise Time Entry: 15  Therapeutic Activity Time Entry: 23,

## 2025-08-03 DIAGNOSIS — F41.9 ANXIETY: ICD-10-CM

## 2025-08-04 RX ORDER — HYDROXYZINE HYDROCHLORIDE 25 MG/1
25 TABLET, FILM COATED ORAL 2 TIMES DAILY
Qty: 60 TABLET | Refills: 0 | Status: SHIPPED | OUTPATIENT
Start: 2025-08-04

## 2025-08-07 DIAGNOSIS — M54.50 LOW BACK PAIN, UNSPECIFIED BACK PAIN LATERALITY, UNSPECIFIED CHRONICITY, UNSPECIFIED WHETHER SCIATICA PRESENT: ICD-10-CM

## 2025-08-07 RX ORDER — CYCLOBENZAPRINE HCL 10 MG
10 TABLET ORAL NIGHTLY PRN
Qty: 60 TABLET | Refills: 0 | Status: SHIPPED | OUTPATIENT
Start: 2025-08-07 | End: 2025-10-06

## 2025-09-03 DIAGNOSIS — F41.9 ANXIETY: ICD-10-CM

## 2025-09-03 RX ORDER — HYDROXYZINE HYDROCHLORIDE 25 MG/1
25 TABLET, FILM COATED ORAL 2 TIMES DAILY
Qty: 60 TABLET | Refills: 0 | Status: SHIPPED | OUTPATIENT
Start: 2025-09-03